# Patient Record
Sex: FEMALE | Race: WHITE | NOT HISPANIC OR LATINO | Employment: UNEMPLOYED | URBAN - METROPOLITAN AREA
[De-identification: names, ages, dates, MRNs, and addresses within clinical notes are randomized per-mention and may not be internally consistent; named-entity substitution may affect disease eponyms.]

---

## 2017-01-10 ENCOUNTER — OFFICE VISIT (OUTPATIENT)
Dept: INTERNAL MEDICINE | Facility: CLINIC | Age: 82
End: 2017-01-10

## 2017-01-10 VITALS
BODY MASS INDEX: 26.86 KG/M2 | HEART RATE: 80 BPM | WEIGHT: 141 LBS | DIASTOLIC BLOOD PRESSURE: 80 MMHG | SYSTOLIC BLOOD PRESSURE: 120 MMHG | TEMPERATURE: 98 F | RESPIRATION RATE: 20 BRPM

## 2017-01-10 DIAGNOSIS — I48.20 CHRONIC ATRIAL FIBRILLATION (HCC): ICD-10-CM

## 2017-01-10 DIAGNOSIS — Z23 NEED FOR PROPHYLACTIC VACCINATION AND INOCULATION AGAINST INFLUENZA: ICD-10-CM

## 2017-01-10 DIAGNOSIS — R10.33 PERIUMBILICAL ABDOMINAL PAIN: ICD-10-CM

## 2017-01-10 DIAGNOSIS — E78.49 OTHER HYPERLIPIDEMIA: ICD-10-CM

## 2017-01-10 DIAGNOSIS — I10 ESSENTIAL HYPERTENSION: ICD-10-CM

## 2017-01-10 DIAGNOSIS — M81.0 OSTEOPOROSIS: ICD-10-CM

## 2017-01-10 DIAGNOSIS — M19.91 PRIMARY OSTEOARTHRITIS, UNSPECIFIED SITE: ICD-10-CM

## 2017-01-10 DIAGNOSIS — E55.9 VITAMIN D DEFICIENCY: ICD-10-CM

## 2017-01-10 DIAGNOSIS — E11.9 TYPE 2 DIABETES MELLITUS WITHOUT COMPLICATION, WITHOUT LONG-TERM CURRENT USE OF INSULIN (HCC): Primary | ICD-10-CM

## 2017-01-10 LAB
BILIRUB BLD-MCNC: NEGATIVE MG/DL
CHOLEST SERPL-MCNC: 203 MG/DL
CLARITY, POC: ABNORMAL
COLOR UR: ABNORMAL
EXPIRATION DATE: ABNORMAL
EXPIRATION DATE: NORMAL
EXPIRATION DATE: NORMAL
GLUCOSE UR STRIP-MCNC: NEGATIVE MG/DL
HBA1C MFR BLD: 5.7 %
HDLC SERPL-MCNC: 93 MG/DL
KETONES UR QL: NEGATIVE
LDLC SERPL CALC-MCNC: 92 MG/DL
LDLC/HDLC SERPL: 1 {RATIO}
LEUKOCYTE EST, POC: ABNORMAL
Lab: ABNORMAL
Lab: NORMAL
Lab: NORMAL
NITRITE UR-MCNC: NEGATIVE MG/ML
PH UR: 5 [PH] (ref 5–8)
PROT UR STRIP-MCNC: NEGATIVE MG/DL
RBC # UR STRIP: NEGATIVE /UL
SP GR UR: 1.02 (ref 1–1.03)
TRIGL SERPL-MCNC: 93 MG/DL
UROBILINOGEN UR QL: NORMAL

## 2017-01-10 PROCEDURE — 83036 HEMOGLOBIN GLYCOSYLATED A1C: CPT | Performed by: INTERNAL MEDICINE

## 2017-01-10 PROCEDURE — 81002 URINALYSIS NONAUTO W/O SCOPE: CPT | Performed by: INTERNAL MEDICINE

## 2017-01-10 PROCEDURE — 36415 COLL VENOUS BLD VENIPUNCTURE: CPT | Performed by: INTERNAL MEDICINE

## 2017-01-10 PROCEDURE — 90662 IIV NO PRSV INCREASED AG IM: CPT | Performed by: INTERNAL MEDICINE

## 2017-01-10 PROCEDURE — 80061 LIPID PANEL: CPT | Performed by: INTERNAL MEDICINE

## 2017-01-10 PROCEDURE — 99214 OFFICE O/P EST MOD 30 MIN: CPT | Performed by: INTERNAL MEDICINE

## 2017-01-10 PROCEDURE — G0008 ADMIN INFLUENZA VIRUS VAC: HCPCS | Performed by: INTERNAL MEDICINE

## 2017-01-10 NOTE — MR AVS SNAPSHOT
Yuli Jose   1/10/2017 10:45 AM   Office Visit    Provider:  Jennie Domínguez MD   Department:  [facility] INTERNAL MEDICINE AND PEDIATRICS   Dept Phone:  [phone]                Your Full Care Plan              Your Updated Medication List          This list is accurate as of: 1/10/17 12:10 PM.  Always use your most recent med list.                amLODIPine 10 MG tablet   Commonly known as:  NORVASC   TAKE 1 TABLET EVERY DAY FOR BLOOD PRESSURE       atorvastatin 10 MG tablet   Commonly known as:  LIPITOR   TAKE 1 TABLET AT BEDTIME       benazepril 20 MG tablet   Commonly known as:  LOTENSIN   TAKE 1 TABLET ONE TIME DAILY       glimepiride 2 MG tablet   Commonly known as:  AMARYL       metoprolol succinate XL 50 MG 24 hr tablet   Commonly known as:  TOPROL-XL   TAKE 1 TABLET ONE TIME DAILY       PRESERVISION AREDS capsule       Prodig[employer] Autocode Blood Glucose W/DEVICE kit       * glucose blood test strip       * PRODIGY AUTOCODE TEST test strip   Generic drug:  glucose blood       * PRODIGY NO CODING BLOOD GLUC test strip   Generic drug:  glucose blood   USE TO TEST ONE TIME DAILY       TYLENOL 500 MG tablet   Generic drug:  acetaminophen       * Notice:  This list has 3 medication(s) that are the same as other medications prescribed for you. Read the directions carefully, and ask your doctor or other care provider to review them with you.            We Performed the Following     Comprehensive Metabolic Panel     High Dose FluZone     POC Glycosylated Hemoglobin (Hb A1C)     POC Lipid Panel     POC Urinalysis Dipstick     TSH     Vitamin D 25 Hydroxy       You Were Diagnosed With        Codes Comments    Type 2 diabetes mellitus without complication, without long-term current use of insulin    -  Primary ICD-10-CM: E11.9  ICD-9-CM: 250.00     Essential hypertension     ICD-10-CM: I10  ICD-9-CM: 401.9     Other hyperlipidemia     ICD-10-CM: E78.4  ICD-9-CM: 272.4     Chronic atrial fibrillation     ICD-10-CM: I48.2  ICD-9-CM: 427.31     Osteoporosis     ICD-10-CM: M81.0  ICD-9-CM: 733.00     Primary osteoarthritis, unspecified site     ICD-10-CM: M19.91  ICD-9-CM: 715.10     Vitamin D deficiency     ICD-10-CM: E55.9  ICD-9-CM: 268.9     Periumbilical abdominal pain     ICD-10-CM: R10.33  ICD-9-CM: 789.05     Need for prophylactic vaccination and inoculation against influenza     ICD-10-CM: Z23  ICD-9-CM: V04.81       Instructions     None    Patient Instructions History      [employer] Signup     [facility] [employer] allows you to send messages to your doctor, view your test results, renew your prescriptions, schedule appointments, and more. To sign up, go to [employer] and click on the Sign Up Now link in the New User? box. Enter your [employer] Activation Code exactly as it appears below along with the last four digits of your Social Security Number and your Date of Birth () to complete the sign-up process. If you do not sign up before the expiration date, you must request a new code.    [employer] Activation Code: [email or web address]  Expires: 2017 12:10 PM    If you have questions, you can email [email or web address] or call [phone] to talk to our [employer] staff. Remember, [employer] is NOT to be used for urgent needs. For medical emergencies, dial 911.               Other Info from Your Visit           Your Appointments     2017  9:30 AM EST   Follow Up with Jennie Domínguez MD   [facility] INTERNAL MEDICINE AND PEDIATRICS (--)    [address] [address] [phone]           Arrive 15 minutes prior to appointment.              Allergies     No Known Allergies      Reason for Visit     Diabetes 6 month follow up   fasting        Vital Signs     Blood Pressure Pulse Temperature Respirations Weight Body Mass Index    120/80 (BP Location: Right arm) 80 98 °F (36.7 °C) (Temporal Artery ) 20 141 lb  (64 kg) 26.86 kg/m2    Smoking Status                   Never Smoker           Problems and Diagnoses Noted     Atrial fibrillation (irregular heartbeat)    Diabetes    High cholesterol or triglycerides    High blood pressure    Osteoarthritis (arthritis due to wear and tear of joints)    Osteoporosis    Vitamin D deficiency    Abdominal pain, around belly button        Needs flu shot          No Longer an Issue     Pain in left knee    Urinary tract infection      Immunizations Administered     Name Date    Influenza Split High Dose Preservative Free IM

## 2017-01-10 NOTE — PROGRESS NOTES
Subjective       Yuli Jose is a 89 y.o. female.     Chief Complaint   Patient presents with   • Diabetes     6 month follow up   fasting         History of Present Illness        History of Present Illness      6 month follow up for chronic medical conditions fasting       History of Present Illness  Primary Care Cardiac Diagnostic Constellation: The patient is here today for a follow-up visit.      Her Diabetes Mellitus Type 2 is stable.   The patient is adherent with her medication regimen. She denies medication side effects.   Medication(s): Glimepiride.   Her Hypertension is primary, but stable.   The patient is adherent with her medication regimen. She denies medication side effects.  Medication(s): Amlodipine, Benazepril, and Metoprolol.   Her Hyperlipidemia has been unstable. Her LDL goal is 70 mg/dL and last LDL was 74 mg/dL.   The patient is adherent with her medication regimen. She denies medication side effects.   Medication(s): Atorvastatin.   Her Atrial Fibrillatiom is stable. She saw Amarilis Gill recently. The patient is adherent with her medication regimen. She denies medication side effects.   Medication(s): Metoprolol. She has been off Aspirin.     Interval Events: The patient states she has not been checking her blood sugar at home recently.  Last hemoglobin A1c was 5.7. She denies episodes of decreased blood sugar. She saw the ophthalmologist 7/14/15 and her macular degeneration was stable. No retinopathy. She was told by Dr. Stephenson she does not need follow up.  She does check her feet daily.      Symptoms: Denies chest pain, denies intermittent leg claudication, denies dyspnea, denies lower extremity edema, denies exercise intolerance, stable fatigue, denies visual impairment, denies muscle pain and denies muscle weakness.  Associated symptoms include recent 13 pounds weight gain and stable memory loss,  but no palpitations, no syncope, no headache, no orthopnea, no PND, no polydipsia, no  polyuria, and no focal neurologic deficits.     Lifestyle and Disease Management: Diet: She consumes a diverse and healthy diet. Weight Issues: She does not have any weight concerns. Exercise: She does not exercise regularly. She does housework.   Smoking: She does not use tobacco.      Osteoporosis: The patient is being seen for routine follow-up of Osteoporosis.   Last DEXA Scan was 2009 (osteoporosis).  Current treatment includes Ared 2 Multivitamin for her macular degeneration daily.   She reports loss of balance (improved) and stable back pain.  Has  difficulty ambulating and difficulty with stairs, but no inability to stand straight, no weight gain, no arthralgias, no myalgias, no hip pain, no wrist pain and no neck pain    The disease type is likely primary/postmenopausal Osteoporosis.     Vitamin D Deficiency:  The patient is here for Vitamin D Deficiency.  She takes Ared 2 Multivitamin, but no additional Vitamin D supplements.  Last Vitamin D3 level on 7/19/16 was 7.5.     Osteoarthritis (Follow-Up): The patient states her Osteoarthritis has been stable since the last visit.   Comorbid Illnesses: Lumbar DDD. She has no complications from osteoarthritis. The patient's osteoarthritis has not resulted in physical disability.   Interval Events: None.  Symptoms: stable back pain,.  denies knee pain, denies hip pain, denies finger pain, denies shoulder pain, and denies neck pain.    Associated symptoms include no localized joint swelling and no localized joint stiffness.   Activities: no limitations.   Medications: The patient is currently on Tylenol and Aleve with mild relief.           Current Outpatient Prescriptions on File Prior to Visit   Medication Sig Dispense Refill   • acetaminophen (TYLENOL) 500 MG tablet Take  by mouth.     • amLODIPine (NORVASC) 10 MG tablet TAKE 1 TABLET EVERY DAY FOR BLOOD PRESSURE 90 tablet 1   • atorvastatin (LIPITOR) 10 MG tablet TAKE 1 TABLET AT BEDTIME 90 tablet 3   •  benazepril (LOTENSIN) 20 MG tablet TAKE 1 TABLET ONE TIME DAILY 90 tablet 3   • Blood Glucose Monitoring Suppl (PRODIGY AUTOCODE BLOOD GLUCOSE) W/DEVICE kit      • glimepiride (AMARYL) 2 MG tablet Take  by mouth.     • glucose blood (PRODIGY AUTOCODE TEST) test strip daily.     • glucose blood test strip daily.     • metoprolol succinate XL (TOPROL-XL) 50 MG 24 hr tablet TAKE 1 TABLET ONE TIME DAILY 90 tablet 3   • Multiple Vitamins-Minerals (PRESERVISION AREDS) capsule Take  by mouth.     • PRODIGY NO CODING BLOOD GLUC test strip USE TO TEST ONE TIME DAILY 100 each 3     No current facility-administered medications on file prior to visit.          The following portions of the patient's history were reviewed and updated as appropriate: allergies, current medications, past family history, past medical history, past social history, past surgical history and problem list.    Review of Systems   Constitutional: Positive for unexpected weight change. Negative for fatigue.   Eyes: Negative for visual disturbance.   Respiratory: Negative for cough, shortness of breath and wheezing.    Cardiovascular: Negative for chest pain, palpitations and leg swelling.        No BENÍTEZ, orthopnea, or claudication.   Gastrointestinal: Positive for abdominal pain (lyric-umbilcal area x 2-3 weeks). Negative for blood in stool, constipation, diarrhea, nausea and vomiting.        No melena.  No heartburn, dysphagia, odynophagia, belching, or bloating.   Endocrine: Negative for polydipsia and polyuria.   Genitourinary: Positive for urgency (chronic). Negative for dysuria, frequency, hematuria and vaginal discharge.   Musculoskeletal: Positive for back pain. Negative for arthralgias and myalgias.   Neurological: Negative for dizziness, syncope and light-headedness.        Stable memory issues.     Psychiatric/Behavioral: Negative for decreased concentration.         Objective       Blood pressure 120/80, pulse 80, temperature 98 °F (36.7 °C),  temperature source Temporal Artery , resp. rate 20, weight 141 lb (64 kg).      Physical Exam   Constitutional: She appears well-developed and well-nourished.   Neck: Normal range of motion. Neck supple. Carotid bruit is not present. No thyromegaly present.   Cardiovascular: Normal rate, regular rhythm, normal heart sounds and intact distal pulses.  Exam reveals no gallop and no friction rub.    No murmur heard.  No peripheral edema.   Pulmonary/Chest: Effort normal and breath sounds normal.   Abdominal: Soft. Bowel sounds are normal. She exhibits no distension, no abdominal bruit and no mass. There is no hepatosplenomegaly. There is tenderness (mild-moderate in lyric-umbilical area). There is no rebound, no guarding and no CVA tenderness.   Psychiatric: She has a normal mood and affect.   Nursing note and vitals reviewed.       Results for orders placed or performed in visit on 01/10/17   POC Lipid Panel   Result Value Ref Range    Total Cholesterol 203 mg/dL    Triglycerides 93 mg/dL    HDL Cholesterol 93 mg/dL    LDL Cholesterol  92 mg/dL    Lot Number [id number]     Expiration Date 8-31-17     LDL/HDL Ratio 1.00    POC Glycosylated Hemoglobin (Hb A1C)   Result Value Ref Range    Hemoglobin A1C 5.7 %    Lot Number [id number]     Expiration Date 8-18    POC Urinalysis Dipstick   Result Value Ref Range    Color [place] Yellow, Straw, Dark Yellow, [place]    Clarity, UA Cloudy (A) Clear    Glucose, UA Negative Negative, 1000 mg/dL (3+) mg/dL    Bilirubin Negative Negative    Ketones, UA Negative Negative    Specific Gravity  1.025 1.005 - 1.030    Blood, UA Negative Negative    pH, Urine 5.0 5.0 - 8.0    Protein, POC Negative Negative mg/dL    Urobilinogen, UA Normal Normal    Leukocytes 75 Marquise/ul (A) Negative    Nitrite, UA Negative Negative    Lot Number [id number]     Expiration Date 9-17          Assessment / Plan:    Yuli was seen today for diabetes.    Diagnoses and all orders for this visit:    Type 2 diabetes mellitus  without complication, without long-term current use of insulin  -     POC Glycosylated Hemoglobin (Hb A1C)  -     Comprehensive Metabolic Panel  -     TSH    Essential hypertension    Other hyperlipidemia  -     POC Lipid Panel    Chronic atrial fibrillation    Osteoporosis    Primary osteoarthritis, unspecified site    Vitamin D deficiency  -     Vitamin D 25 Hydroxy    Periumbilical abdominal pain  -     POC Urinalysis Dipstick  -     US Abdomen Limited; Future  -     Urine Culture    Need for prophylactic vaccination and inoculation against influenza  -     High Dose FluZone    The patient declines Zostavax, DEXA Scan, and Mammogram.      Return in about 6 months (around 7/10/2017) for Recheck-Diabetes, fasting.

## 2017-01-11 LAB
25(OH)D3+25(OH)D2 SERPL-MCNC: 12.6 NG/ML
ALBUMIN SERPL-MCNC: 4.4 G/DL (ref 3.2–4.8)
ALBUMIN/GLOB SERPL: 1.4 G/DL (ref 1.5–2.5)
ALP SERPL-CCNC: 79 U/L (ref 25–100)
ALT SERPL-CCNC: 18 U/L (ref 7–40)
AST SERPL-CCNC: 22 U/L (ref 0–33)
BILIRUB SERPL-MCNC: 0.9 MG/DL (ref 0.3–1.2)
BUN SERPL-MCNC: 19 MG/DL (ref 9–23)
BUN/CREAT SERPL: 23.8 (ref 7–25)
CALCIUM SERPL-MCNC: 10 MG/DL (ref 8.7–10.4)
CHLORIDE SERPL-SCNC: 102 MMOL/L (ref 99–109)
CO2 SERPL-SCNC: 31 MMOL/L (ref 20–31)
CREAT SERPL-MCNC: 0.8 MG/DL (ref 0.6–1.3)
GLOBULIN SER CALC-MCNC: 3.2 GM/DL
GLUCOSE SERPL-MCNC: 129 MG/DL (ref 70–100)
POTASSIUM SERPL-SCNC: 4.7 MMOL/L (ref 3.5–5.5)
PROT SERPL-MCNC: 7.6 G/DL (ref 5.7–8.2)
SODIUM SERPL-SCNC: 142 MMOL/L (ref 132–146)
TSH SERPL DL<=0.005 MIU/L-ACNC: 1.76 MIU/ML (ref 0.35–5.35)

## 2017-01-13 LAB
BACTERIA UR CULT: NORMAL
BACTERIA UR CULT: NORMAL

## 2017-01-18 ENCOUNTER — TELEPHONE (OUTPATIENT)
Dept: INTERNAL MEDICINE | Facility: CLINIC | Age: 82
End: 2017-01-18

## 2017-01-18 NOTE — TELEPHONE ENCOUNTER
----- Message from Colette Lara sent at 1/18/2017 12:38 PM EST -----  Concerning pts ultrasound, patient said she is not having any abdominal pain any more and doesn't feel she needs this. Is this okay to cancel?

## 2017-04-11 ENCOUNTER — OFFICE VISIT (OUTPATIENT)
Dept: INTERNAL MEDICINE | Facility: CLINIC | Age: 82
End: 2017-04-11

## 2017-04-11 VITALS
BODY MASS INDEX: 25.91 KG/M2 | OXYGEN SATURATION: 94 % | DIASTOLIC BLOOD PRESSURE: 68 MMHG | HEART RATE: 76 BPM | RESPIRATION RATE: 20 BRPM | SYSTOLIC BLOOD PRESSURE: 128 MMHG | WEIGHT: 136 LBS | TEMPERATURE: 97.6 F

## 2017-04-11 DIAGNOSIS — M19.91 PRIMARY OSTEOARTHRITIS, UNSPECIFIED SITE: ICD-10-CM

## 2017-04-11 DIAGNOSIS — I48.20 CHRONIC ATRIAL FIBRILLATION (HCC): ICD-10-CM

## 2017-04-11 DIAGNOSIS — E55.9 VITAMIN D DEFICIENCY: ICD-10-CM

## 2017-04-11 DIAGNOSIS — E78.49 OTHER HYPERLIPIDEMIA: ICD-10-CM

## 2017-04-11 DIAGNOSIS — M81.0 OSTEOPOROSIS: ICD-10-CM

## 2017-04-11 DIAGNOSIS — I10 ESSENTIAL HYPERTENSION: ICD-10-CM

## 2017-04-11 DIAGNOSIS — E11.9 TYPE 2 DIABETES MELLITUS WITHOUT COMPLICATION, WITHOUT LONG-TERM CURRENT USE OF INSULIN (HCC): Primary | ICD-10-CM

## 2017-04-11 LAB
25(OH)D3+25(OH)D2 SERPL-MCNC: 9.1 NG/ML
ALBUMIN SERPL-MCNC: 4.2 G/DL (ref 3.2–4.8)
ALBUMIN/GLOB SERPL: 1.5 G/DL (ref 1.5–2.5)
ALP SERPL-CCNC: 82 U/L (ref 25–100)
ALT SERPL-CCNC: 13 U/L (ref 7–40)
AST SERPL-CCNC: 21 U/L (ref 0–33)
BASOPHILS # BLD AUTO: 0.05 10*3/MM3 (ref 0–0.2)
BASOPHILS NFR BLD AUTO: 0.8 % (ref 0–1)
BILIRUB SERPL-MCNC: 0.6 MG/DL (ref 0.3–1.2)
BUN SERPL-MCNC: 13 MG/DL (ref 9–23)
BUN/CREAT SERPL: 18.6 (ref 7–25)
CALCIUM SERPL-MCNC: 10 MG/DL (ref 8.7–10.4)
CHLORIDE SERPL-SCNC: 98 MMOL/L (ref 99–109)
CHOLEST SERPL-MCNC: 223 MG/DL (ref 0–200)
CO2 SERPL-SCNC: 26 MMOL/L (ref 20–31)
CREAT SERPL-MCNC: 0.7 MG/DL (ref 0.6–1.3)
EOSINOPHIL # BLD AUTO: 0.15 10*3/MM3 (ref 0.1–0.3)
EOSINOPHIL NFR BLD AUTO: 2.4 % (ref 0–3)
ERYTHROCYTE [DISTWIDTH] IN BLOOD BY AUTOMATED COUNT: 13.7 % (ref 11.3–14.5)
EXPIRATION DATE: NORMAL
GLOBULIN SER CALC-MCNC: 2.8 GM/DL
GLUCOSE SERPL-MCNC: 136 MG/DL (ref 70–100)
HBA1C MFR BLD: 5.9 %
HCT VFR BLD AUTO: 45.1 % (ref 34.5–44)
HDLC SERPL-MCNC: 69 MG/DL (ref 40–60)
HGB BLD-MCNC: 15.1 G/DL (ref 11.5–15.5)
IMM GRANULOCYTES # BLD: 0.01 10*3/MM3 (ref 0–0.03)
IMM GRANULOCYTES NFR BLD: 0.2 % (ref 0–0.6)
LDLC SERPL CALC-MCNC: 129 MG/DL (ref 0–100)
LYMPHOCYTES # BLD AUTO: 2.13 10*3/MM3 (ref 0.6–4.8)
LYMPHOCYTES NFR BLD AUTO: 33.5 % (ref 24–44)
Lab: NORMAL
MCH RBC QN AUTO: 32.2 PG (ref 27–31)
MCHC RBC AUTO-ENTMCNC: 33.5 G/DL (ref 32–36)
MCV RBC AUTO: 96.2 FL (ref 80–99)
MONOCYTES # BLD AUTO: 0.76 10*3/MM3 (ref 0–1)
MONOCYTES NFR BLD AUTO: 11.9 % (ref 0–12)
NEUTROPHILS # BLD AUTO: 3.26 10*3/MM3 (ref 1.5–8.3)
NEUTROPHILS NFR BLD AUTO: 51.2 % (ref 41–71)
PLATELET # BLD AUTO: 232 10*3/MM3 (ref 150–450)
POTASSIUM SERPL-SCNC: 4.8 MMOL/L (ref 3.5–5.5)
PROT SERPL-MCNC: 7 G/DL (ref 5.7–8.2)
RBC # BLD AUTO: 4.69 10*6/MM3 (ref 3.89–5.14)
SODIUM SERPL-SCNC: 131 MMOL/L (ref 132–146)
TRIGL SERPL-MCNC: 123 MG/DL (ref 0–150)
TSH SERPL DL<=0.005 MIU/L-ACNC: 1.58 MIU/ML (ref 0.35–5.35)
VLDLC SERPL CALC-MCNC: 24.6 MG/DL
WBC # BLD AUTO: 6.36 10*3/MM3 (ref 3.5–10.8)

## 2017-04-11 PROCEDURE — 36415 COLL VENOUS BLD VENIPUNCTURE: CPT | Performed by: INTERNAL MEDICINE

## 2017-04-11 PROCEDURE — 99214 OFFICE O/P EST MOD 30 MIN: CPT | Performed by: INTERNAL MEDICINE

## 2017-04-11 PROCEDURE — 83036 HEMOGLOBIN GLYCOSYLATED A1C: CPT | Performed by: INTERNAL MEDICINE

## 2017-04-11 RX ORDER — GLIMEPIRIDE 2 MG/1
2 TABLET ORAL 2 TIMES DAILY
COMMUNITY
End: 2020-06-08
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
## 2017-04-12 ENCOUNTER — TELEPHONE (OUTPATIENT)
Dept: INTERNAL MEDICINE | Facility: CLINIC | Age: 82
End: 2017-04-12

## 2020-05-11 PROBLEM — S72.002A CLOSED LEFT HIP FRACTURE (HCC): Status: ACTIVE | Noted: 2020-05-11

## 2020-06-08 ENCOUNTER — APPOINTMENT (OUTPATIENT)
Dept: GENERAL RADIOLOGY | Facility: HOSPITAL | Age: 85
End: 2020-06-08

## 2020-06-08 ENCOUNTER — ANESTHESIA EVENT (OUTPATIENT)
Dept: PERIOP | Facility: HOSPITAL | Age: 85
End: 2020-06-08

## 2020-06-08 ENCOUNTER — HOSPITAL ENCOUNTER (INPATIENT)
Facility: HOSPITAL | Age: 85
LOS: 3 days | Discharge: NURSING, REHAB OR LONG-TERM CARE | End: 2020-06-11
Attending: EMERGENCY MEDICINE | Admitting: INTERNAL MEDICINE

## 2020-06-08 DIAGNOSIS — S72.011A CLOSED SUBCAPITAL FRACTURE OF RIGHT FEMUR, INITIAL ENCOUNTER (HCC): Primary | ICD-10-CM

## 2020-06-08 DIAGNOSIS — S72.011S: ICD-10-CM

## 2020-06-08 DIAGNOSIS — F03.C0 ADVANCED DEMENTIA (HCC): ICD-10-CM

## 2020-06-08 DIAGNOSIS — Z91.81 HISTORY OF RECENT FALL: ICD-10-CM

## 2020-06-08 DIAGNOSIS — Z74.1 REQUIRES ASSISTANCE WITH ACTIVITIES OF DAILY LIVING (ADL): ICD-10-CM

## 2020-06-08 PROBLEM — N39.0 ACUTE UTI (URINARY TRACT INFECTION): Status: ACTIVE | Noted: 2020-06-08

## 2020-06-08 PROBLEM — N13.9 ACUTE URINARY OBSTRUCTION: Status: ACTIVE | Noted: 2020-06-08

## 2020-06-08 LAB
ABO GROUP BLD: NORMAL
ABO GROUP BLD: NORMAL
ALBUMIN SERPL-MCNC: 3 G/DL (ref 3.5–5.2)
ALBUMIN/GLOB SERPL: 1 G/DL
ALP SERPL-CCNC: 140 U/L (ref 39–117)
ALT SERPL W P-5'-P-CCNC: 5 U/L (ref 1–33)
ANION GAP SERPL CALCULATED.3IONS-SCNC: 10 MMOL/L (ref 5–15)
APTT PPP: 36.5 SECONDS (ref 24–37)
AST SERPL-CCNC: 16 U/L (ref 1–32)
BACTERIA UR QL AUTO: NORMAL /HPF
BASOPHILS # BLD AUTO: 0.04 10*3/MM3 (ref 0–0.2)
BASOPHILS NFR BLD AUTO: 0.8 % (ref 0–1.5)
BILIRUB SERPL-MCNC: 0.4 MG/DL (ref 0.2–1.2)
BILIRUB UR QL STRIP: ABNORMAL
BLD GP AB SCN SERPL QL: NEGATIVE
BUN BLD-MCNC: 28 MG/DL (ref 8–23)
BUN/CREAT SERPL: 42.4 (ref 7–25)
CALCIUM SPEC-SCNC: 8.5 MG/DL (ref 8.2–9.6)
CHLORIDE SERPL-SCNC: 102 MMOL/L (ref 98–107)
CLARITY UR: CLEAR
CO2 SERPL-SCNC: 28 MMOL/L (ref 22–29)
COLOR UR: ABNORMAL
CREAT BLD-MCNC: 0.66 MG/DL (ref 0.57–1)
DEPRECATED RDW RBC AUTO: 61.1 FL (ref 37–54)
EOSINOPHIL # BLD AUTO: 0.28 10*3/MM3 (ref 0–0.4)
EOSINOPHIL NFR BLD AUTO: 5.3 % (ref 0.3–6.2)
ERYTHROCYTE [DISTWIDTH] IN BLOOD BY AUTOMATED COUNT: 16.1 % (ref 12.3–15.4)
GFR SERPL CREATININE-BSD FRML MDRD: 83 ML/MIN/1.73
GLOBULIN UR ELPH-MCNC: 3 GM/DL
GLUCOSE BLD-MCNC: 84 MG/DL (ref 65–99)
GLUCOSE UR STRIP-MCNC: NEGATIVE MG/DL
HCT VFR BLD AUTO: 38.8 % (ref 34–46.6)
HGB BLD-MCNC: 12.3 G/DL (ref 12–15.9)
HGB UR QL STRIP.AUTO: NEGATIVE
HYALINE CASTS UR QL AUTO: NORMAL /LPF
IMM GRANULOCYTES # BLD AUTO: 0.02 10*3/MM3 (ref 0–0.05)
IMM GRANULOCYTES NFR BLD AUTO: 0.4 % (ref 0–0.5)
INR PPP: 1.09 (ref 0.85–1.16)
KETONES UR QL STRIP: NEGATIVE
LEUKOCYTE ESTERASE UR QL STRIP.AUTO: ABNORMAL
LYMPHOCYTES # BLD AUTO: 1.73 10*3/MM3 (ref 0.7–3.1)
LYMPHOCYTES NFR BLD AUTO: 32.8 % (ref 19.6–45.3)
MCH RBC QN AUTO: 32.5 PG (ref 26.6–33)
MCHC RBC AUTO-ENTMCNC: 31.7 G/DL (ref 31.5–35.7)
MCV RBC AUTO: 102.4 FL (ref 79–97)
MONOCYTES # BLD AUTO: 0.71 10*3/MM3 (ref 0.1–0.9)
MONOCYTES NFR BLD AUTO: 13.5 % (ref 5–12)
NEUTROPHILS # BLD AUTO: 2.49 10*3/MM3 (ref 1.7–7)
NEUTROPHILS NFR BLD AUTO: 47.2 % (ref 42.7–76)
NITRITE UR QL STRIP: POSITIVE
NRBC BLD AUTO-RTO: 0 /100 WBC (ref 0–0.2)
NT-PROBNP SERPL-MCNC: 2499 PG/ML (ref 5–1800)
PH UR STRIP.AUTO: 5.5 [PH] (ref 5–8)
PLATELET # BLD AUTO: 242 10*3/MM3 (ref 140–450)
PMV BLD AUTO: 9.8 FL (ref 6–12)
POTASSIUM BLD-SCNC: 4.6 MMOL/L (ref 3.5–5.2)
PROT SERPL-MCNC: 6 G/DL (ref 6–8.5)
PROT UR QL STRIP: NEGATIVE
PROTHROMBIN TIME: 13.8 SECONDS (ref 11.5–14)
RBC # BLD AUTO: 3.79 10*6/MM3 (ref 3.77–5.28)
RBC # UR: NORMAL /HPF
REF LAB TEST METHOD: NORMAL
RH BLD: POSITIVE
RH BLD: POSITIVE
SARS-COV-2 RNA PNL SPEC NAA+PROBE: NOT DETECTED
SODIUM BLD-SCNC: 140 MMOL/L (ref 136–145)
SP GR UR STRIP: >=1.03 (ref 1–1.03)
SQUAMOUS #/AREA URNS HPF: NORMAL /HPF
T&S EXPIRATION DATE: NORMAL
TROPONIN T SERPL-MCNC: <0.01 NG/ML (ref 0–0.03)
UROBILINOGEN UR QL STRIP: ABNORMAL
WBC NRBC COR # BLD: 5.27 10*3/MM3 (ref 3.4–10.8)
WBC UR QL AUTO: NORMAL /HPF

## 2020-06-08 PROCEDURE — 86901 BLOOD TYPING SEROLOGIC RH(D): CPT

## 2020-06-08 PROCEDURE — 71045 X-RAY EXAM CHEST 1 VIEW: CPT

## 2020-06-08 PROCEDURE — 81001 URINALYSIS AUTO W/SCOPE: CPT | Performed by: EMERGENCY MEDICINE

## 2020-06-08 PROCEDURE — 73502 X-RAY EXAM HIP UNI 2-3 VIEWS: CPT

## 2020-06-08 PROCEDURE — 85610 PROTHROMBIN TIME: CPT | Performed by: EMERGENCY MEDICINE

## 2020-06-08 PROCEDURE — 87635 SARS-COV-2 COVID-19 AMP PRB: CPT | Performed by: ORTHOPAEDIC SURGERY

## 2020-06-08 PROCEDURE — 83880 ASSAY OF NATRIURETIC PEPTIDE: CPT | Performed by: EMERGENCY MEDICINE

## 2020-06-08 PROCEDURE — 85025 COMPLETE CBC W/AUTO DIFF WBC: CPT | Performed by: EMERGENCY MEDICINE

## 2020-06-08 PROCEDURE — 86900 BLOOD TYPING SEROLOGIC ABO: CPT | Performed by: EMERGENCY MEDICINE

## 2020-06-08 PROCEDURE — 93005 ELECTROCARDIOGRAM TRACING: CPT | Performed by: EMERGENCY MEDICINE

## 2020-06-08 PROCEDURE — 86850 RBC ANTIBODY SCREEN: CPT | Performed by: EMERGENCY MEDICINE

## 2020-06-08 PROCEDURE — 99284 EMERGENCY DEPT VISIT MOD MDM: CPT

## 2020-06-08 PROCEDURE — 99222 1ST HOSP IP/OBS MODERATE 55: CPT | Performed by: FAMILY MEDICINE

## 2020-06-08 PROCEDURE — 84484 ASSAY OF TROPONIN QUANT: CPT | Performed by: EMERGENCY MEDICINE

## 2020-06-08 PROCEDURE — 85730 THROMBOPLASTIN TIME PARTIAL: CPT | Performed by: EMERGENCY MEDICINE

## 2020-06-08 PROCEDURE — 25010000002 CEFTRIAXONE PER 250 MG: Performed by: PHYSICIAN ASSISTANT

## 2020-06-08 PROCEDURE — 86901 BLOOD TYPING SEROLOGIC RH(D): CPT | Performed by: EMERGENCY MEDICINE

## 2020-06-08 PROCEDURE — 87086 URINE CULTURE/COLONY COUNT: CPT | Performed by: PHYSICIAN ASSISTANT

## 2020-06-08 PROCEDURE — 80053 COMPREHEN METABOLIC PANEL: CPT | Performed by: EMERGENCY MEDICINE

## 2020-06-08 PROCEDURE — 86900 BLOOD TYPING SEROLOGIC ABO: CPT

## 2020-06-08 RX ORDER — INSULIN GLARGINE 100 [IU]/ML
10 INJECTION, SOLUTION SUBCUTANEOUS DAILY
COMMUNITY

## 2020-06-08 RX ORDER — SODIUM CHLORIDE 0.9 % (FLUSH) 0.9 %
10 SYRINGE (ML) INJECTION AS NEEDED
Status: DISCONTINUED | OUTPATIENT
Start: 2020-06-08 | End: 2020-06-11 | Stop reason: HOSPADM

## 2020-06-08 RX ORDER — NICOTINE POLACRILEX 4 MG
15 LOZENGE BUCCAL
Status: DISCONTINUED | OUTPATIENT
Start: 2020-06-08 | End: 2020-06-11 | Stop reason: HOSPADM

## 2020-06-08 RX ORDER — SODIUM CHLORIDE, SODIUM LACTATE, POTASSIUM CHLORIDE, CALCIUM CHLORIDE 600; 310; 30; 20 MG/100ML; MG/100ML; MG/100ML; MG/100ML
9 INJECTION, SOLUTION INTRAVENOUS CONTINUOUS
Status: DISCONTINUED | OUTPATIENT
Start: 2020-06-08 | End: 2020-06-10

## 2020-06-08 RX ORDER — POLYETHYLENE GLYCOL 3350 17 G/17G
17 POWDER, FOR SOLUTION ORAL DAILY PRN
Status: DISCONTINUED | OUTPATIENT
Start: 2020-06-08 | End: 2020-06-11 | Stop reason: HOSPADM

## 2020-06-08 RX ORDER — METOPROLOL SUCCINATE 25 MG/1
25 TABLET, EXTENDED RELEASE ORAL DAILY
COMMUNITY

## 2020-06-08 RX ORDER — ASPIRIN 81 MG/1
81 TABLET ORAL DAILY
COMMUNITY

## 2020-06-08 RX ORDER — SODIUM CHLORIDE 0.9 % (FLUSH) 0.9 %
10 SYRINGE (ML) INJECTION EVERY 12 HOURS SCHEDULED
Status: DISCONTINUED | OUTPATIENT
Start: 2020-06-08 | End: 2020-06-11 | Stop reason: HOSPADM

## 2020-06-08 RX ORDER — LISINOPRIL 10 MG/1
10 TABLET ORAL
Status: DISCONTINUED | OUTPATIENT
Start: 2020-06-09 | End: 2020-06-11 | Stop reason: HOSPADM

## 2020-06-08 RX ORDER — SODIUM CHLORIDE 0.9 % (FLUSH) 0.9 %
10 SYRINGE (ML) INJECTION EVERY 12 HOURS SCHEDULED
Status: DISCONTINUED | OUTPATIENT
Start: 2020-06-08 | End: 2020-06-09 | Stop reason: HOSPADM

## 2020-06-08 RX ORDER — SODIUM CHLORIDE 0.9 % (FLUSH) 0.9 %
10 SYRINGE (ML) INJECTION AS NEEDED
Status: DISCONTINUED | OUTPATIENT
Start: 2020-06-08 | End: 2020-06-09 | Stop reason: HOSPADM

## 2020-06-08 RX ORDER — AMOXICILLIN AND CLAVULANATE POTASSIUM 875; 125 MG/1; MG/1
1 TABLET, FILM COATED ORAL 2 TIMES DAILY
COMMUNITY
End: 2020-06-11 | Stop reason: HOSPADM

## 2020-06-08 RX ORDER — FAMOTIDINE 20 MG/1
20 TABLET, FILM COATED ORAL ONCE
Status: DISCONTINUED | OUTPATIENT
Start: 2020-06-08 | End: 2020-06-08

## 2020-06-08 RX ORDER — DOCUSATE SODIUM 100 MG/1
100 CAPSULE, LIQUID FILLED ORAL DAILY
Status: DISCONTINUED | OUTPATIENT
Start: 2020-06-09 | End: 2020-06-11 | Stop reason: HOSPADM

## 2020-06-08 RX ORDER — DOCUSATE SODIUM 100 MG/1
100 CAPSULE, LIQUID FILLED ORAL DAILY
COMMUNITY

## 2020-06-08 RX ORDER — SODIUM CHLORIDE 9 MG/ML
50 INJECTION, SOLUTION INTRAVENOUS CONTINUOUS
Status: ACTIVE | OUTPATIENT
Start: 2020-06-08 | End: 2020-06-09

## 2020-06-08 RX ORDER — CHOLECALCIFEROL (VITAMIN D3) 125 MCG
5 CAPSULE ORAL NIGHTLY PRN
Status: DISCONTINUED | OUTPATIENT
Start: 2020-06-08 | End: 2020-06-11 | Stop reason: HOSPADM

## 2020-06-08 RX ORDER — LANOLIN ALCOHOL/MO/W.PET/CERES
1000 CREAM (GRAM) TOPICAL DAILY
COMMUNITY
End: 2020-06-08

## 2020-06-08 RX ORDER — ACETAMINOPHEN 325 MG/1
650 TABLET ORAL EVERY 4 HOURS PRN
Status: DISCONTINUED | OUTPATIENT
Start: 2020-06-08 | End: 2020-06-11 | Stop reason: HOSPADM

## 2020-06-08 RX ORDER — LIDOCAINE HYDROCHLORIDE 10 MG/ML
0.5 INJECTION, SOLUTION EPIDURAL; INFILTRATION; INTRACAUDAL; PERINEURAL ONCE AS NEEDED
Status: DISCONTINUED | OUTPATIENT
Start: 2020-06-08 | End: 2020-06-09 | Stop reason: HOSPADM

## 2020-06-08 RX ORDER — DEXTROSE MONOHYDRATE 25 G/50ML
25 INJECTION, SOLUTION INTRAVENOUS
Status: DISCONTINUED | OUTPATIENT
Start: 2020-06-08 | End: 2020-06-11 | Stop reason: HOSPADM

## 2020-06-08 RX ORDER — ACETAMINOPHEN 325 MG/1
650 TABLET ORAL EVERY 6 HOURS PRN
COMMUNITY

## 2020-06-08 RX ORDER — ONDANSETRON 2 MG/ML
4 INJECTION INTRAMUSCULAR; INTRAVENOUS EVERY 6 HOURS PRN
Status: DISCONTINUED | OUTPATIENT
Start: 2020-06-08 | End: 2020-06-11 | Stop reason: HOSPADM

## 2020-06-08 RX ORDER — METOPROLOL SUCCINATE 25 MG/1
25 TABLET, EXTENDED RELEASE ORAL DAILY
Status: DISCONTINUED | OUTPATIENT
Start: 2020-06-09 | End: 2020-06-11 | Stop reason: HOSPADM

## 2020-06-08 RX ORDER — TRAMADOL HYDROCHLORIDE 50 MG/1
50 TABLET ORAL DAILY
COMMUNITY
End: 2020-06-11 | Stop reason: HOSPADM

## 2020-06-08 RX ORDER — BENAZEPRIL HYDROCHLORIDE 10 MG/1
10 TABLET ORAL DAILY
COMMUNITY

## 2020-06-08 RX ORDER — PHENAZOPYRIDINE HYDROCHLORIDE 100 MG/1
100 TABLET, FILM COATED ORAL 3 TIMES DAILY PRN
COMMUNITY
End: 2020-06-11 | Stop reason: HOSPADM

## 2020-06-08 RX ORDER — FAMOTIDINE 10 MG/ML
20 INJECTION, SOLUTION INTRAVENOUS ONCE
Status: COMPLETED | OUTPATIENT
Start: 2020-06-08 | End: 2020-06-08

## 2020-06-08 RX ADMIN — CEFTRIAXONE 1 G: 1 INJECTION, POWDER, FOR SOLUTION INTRAMUSCULAR; INTRAVENOUS at 21:26

## 2020-06-08 RX ADMIN — SODIUM CHLORIDE, PRESERVATIVE FREE 10 ML: 5 INJECTION INTRAVENOUS at 21:14

## 2020-06-08 RX ADMIN — SODIUM CHLORIDE, POTASSIUM CHLORIDE, SODIUM LACTATE AND CALCIUM CHLORIDE 9 ML/HR: 600; 310; 30; 20 INJECTION, SOLUTION INTRAVENOUS at 21:14

## 2020-06-08 RX ADMIN — FAMOTIDINE 20 MG: 10 INJECTION, SOLUTION INTRAVENOUS at 21:14
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
## 2020-06-09 ENCOUNTER — APPOINTMENT (OUTPATIENT)
Dept: GENERAL RADIOLOGY | Facility: HOSPITAL | Age: 85
End: 2020-06-09

## 2020-06-09 ENCOUNTER — ANESTHESIA (OUTPATIENT)
Dept: PERIOP | Facility: HOSPITAL | Age: 85
End: 2020-06-09

## 2020-06-09 LAB
ANION GAP SERPL CALCULATED.3IONS-SCNC: 13 MMOL/L (ref 5–15)
BUN BLD-MCNC: 20 MG/DL (ref 8–23)
BUN/CREAT SERPL: 38.5 (ref 7–25)
CALCIUM SPEC-SCNC: 8.7 MG/DL (ref 8.2–9.6)
CHLORIDE SERPL-SCNC: 102 MMOL/L (ref 98–107)
CO2 SERPL-SCNC: 20 MMOL/L (ref 22–29)
CREAT BLD-MCNC: 0.52 MG/DL (ref 0.57–1)
DEPRECATED RDW RBC AUTO: 61.5 FL (ref 37–54)
ERYTHROCYTE [DISTWIDTH] IN BLOOD BY AUTOMATED COUNT: 16 % (ref 12.3–15.4)
GFR SERPL CREATININE-BSD FRML MDRD: 109 ML/MIN/1.73
GLUCOSE BLD-MCNC: 74 MG/DL (ref 65–99)
GLUCOSE BLDC GLUCOMTR-MCNC: 109 MG/DL (ref 70–130)
GLUCOSE BLDC GLUCOMTR-MCNC: 114 MG/DL (ref 70–130)
HCT VFR BLD AUTO: 41.6 % (ref 34–46.6)
HGB BLD-MCNC: 13 G/DL (ref 12–15.9)
MCH RBC QN AUTO: 32.2 PG (ref 26.6–33)
MCHC RBC AUTO-ENTMCNC: 31.3 G/DL (ref 31.5–35.7)
MCV RBC AUTO: 103 FL (ref 79–97)
PLATELET # BLD AUTO: 254 10*3/MM3 (ref 140–450)
PMV BLD AUTO: 10.4 FL (ref 6–12)
POTASSIUM BLD-SCNC: 4.7 MMOL/L (ref 3.5–5.2)
RBC # BLD AUTO: 4.04 10*6/MM3 (ref 3.77–5.28)
SODIUM BLD-SCNC: 135 MMOL/L (ref 136–145)
WBC NRBC COR # BLD: 6 10*3/MM3 (ref 3.4–10.8)

## 2020-06-09 PROCEDURE — 73502 X-RAY EXAM HIP UNI 2-3 VIEWS: CPT

## 2020-06-09 PROCEDURE — 99232 SBSQ HOSP IP/OBS MODERATE 35: CPT | Performed by: INTERNAL MEDICINE

## 2020-06-09 PROCEDURE — 25010000002 PROPOFOL 10 MG/ML EMULSION: Performed by: ANESTHESIOLOGY

## 2020-06-09 PROCEDURE — 73070 X-RAY EXAM OF ELBOW: CPT

## 2020-06-09 PROCEDURE — 25010000003 EPINEPHRINE 30 MG/30ML SOLUTION 30 ML VIAL: Performed by: ORTHOPAEDIC SURGERY

## 2020-06-09 PROCEDURE — C1713 ANCHOR/SCREW BN/BN,TIS/BN: HCPCS | Performed by: ORTHOPAEDIC SURGERY

## 2020-06-09 PROCEDURE — 80048 BASIC METABOLIC PNL TOTAL CA: CPT | Performed by: PHYSICIAN ASSISTANT

## 2020-06-09 PROCEDURE — 25010000002 LORAZEPAM PER 2 MG: Performed by: INTERNAL MEDICINE

## 2020-06-09 PROCEDURE — 25010000002 CEFTRIAXONE PER 250 MG: Performed by: ORTHOPAEDIC SURGERY

## 2020-06-09 PROCEDURE — 25010000002 ONDANSETRON PER 1 MG: Performed by: ANESTHESIOLOGY

## 2020-06-09 PROCEDURE — 25010000002 NEOSTIGMINE 10 MG/10ML SOLUTION: Performed by: ANESTHESIOLOGY

## 2020-06-09 PROCEDURE — 76000 FLUOROSCOPY <1 HR PHYS/QHP: CPT

## 2020-06-09 PROCEDURE — 73110 X-RAY EXAM OF WRIST: CPT

## 2020-06-09 PROCEDURE — 27236 TREAT THIGH FRACTURE: CPT | Performed by: PHYSICIAN ASSISTANT

## 2020-06-09 PROCEDURE — 82962 GLUCOSE BLOOD TEST: CPT

## 2020-06-09 PROCEDURE — 85027 COMPLETE CBC AUTOMATED: CPT | Performed by: PHYSICIAN ASSISTANT

## 2020-06-09 PROCEDURE — 25010000003 CEFAZOLIN IN DEXTROSE 2-4 GM/100ML-% SOLUTION: Performed by: ORTHOPAEDIC SURGERY

## 2020-06-09 PROCEDURE — C1776 JOINT DEVICE (IMPLANTABLE): HCPCS | Performed by: ORTHOPAEDIC SURGERY

## 2020-06-09 PROCEDURE — 25010000002 PHENYLEPHRINE PER 1 ML: Performed by: ANESTHESIOLOGY

## 2020-06-09 PROCEDURE — 73630 X-RAY EXAM OF FOOT: CPT

## 2020-06-09 PROCEDURE — 25010000002 MORPHINE PER 10 MG: Performed by: FAMILY MEDICINE

## 2020-06-09 PROCEDURE — 25010000002 DEXAMETHASONE SODIUM PHOSPHATE 10 MG/ML SOLUTION: Performed by: ANESTHESIOLOGY

## 2020-06-09 DEVICE — CAP PRT HIP BIPOL FX: Type: IMPLANTABLE DEVICE | Site: HIP | Status: FUNCTIONAL

## 2020-06-09 DEVICE — BONE PREPARATION KIT
Type: IMPLANTABLE DEVICE | Site: HIP | Status: FUNCTIONAL
Brand: BIOPREP

## 2020-06-09 DEVICE — IMPLANTABLE DEVICE: Type: IMPLANTABLE DEVICE | Site: HIP | Status: FUNCTIONAL

## 2020-06-09 DEVICE — HD FEM MOD COCR 28MM STD/NK: Type: IMPLANTABLE DEVICE | Site: HIP | Status: FUNCTIONAL

## 2020-06-09 DEVICE — CUP ACET RINGLOC BIPOL 28MM 46MM: Type: IMPLANTABLE DEVICE | Site: HIP | Status: FUNCTIONAL

## 2020-06-09 DEVICE — CMT BONE SIMPLEX/P TMYCIN FDOS 10PK: Type: IMPLANTABLE DEVICE | Site: HIP | Status: FUNCTIONAL

## 2020-06-09 DEVICE — DEV CONTRL TISS STRATAFIX SYMM PDS PLUS VIL CT-1 45CM: Type: IMPLANTABLE DEVICE | Site: HIP | Status: FUNCTIONAL

## 2020-06-09 RX ORDER — CEFAZOLIN SODIUM 2 G/100ML
2 INJECTION, SOLUTION INTRAVENOUS ONCE
Status: COMPLETED | OUTPATIENT
Start: 2020-06-09 | End: 2020-06-09

## 2020-06-09 RX ORDER — SODIUM CHLORIDE, SODIUM LACTATE, POTASSIUM CHLORIDE, CALCIUM CHLORIDE 600; 310; 30; 20 MG/100ML; MG/100ML; MG/100ML; MG/100ML
100 INJECTION, SOLUTION INTRAVENOUS CONTINUOUS
Status: DISCONTINUED | OUTPATIENT
Start: 2020-06-09 | End: 2020-06-11 | Stop reason: HOSPADM

## 2020-06-09 RX ORDER — OXYCODONE HYDROCHLORIDE 5 MG/1
5 TABLET ORAL EVERY 4 HOURS PRN
Status: DISCONTINUED | OUTPATIENT
Start: 2020-06-09 | End: 2020-06-11 | Stop reason: HOSPADM

## 2020-06-09 RX ORDER — TRAMADOL HYDROCHLORIDE 50 MG/1
50 TABLET ORAL EVERY 6 HOURS PRN
Status: DISCONTINUED | OUTPATIENT
Start: 2020-06-09 | End: 2020-06-11 | Stop reason: HOSPADM

## 2020-06-09 RX ORDER — LORAZEPAM 2 MG/ML
0.5 INJECTION INTRAMUSCULAR ONCE
Status: COMPLETED | OUTPATIENT
Start: 2020-06-09 | End: 2020-06-09

## 2020-06-09 RX ORDER — SODIUM CHLORIDE 0.9 % (FLUSH) 0.9 %
10 SYRINGE (ML) INJECTION EVERY 12 HOURS SCHEDULED
Status: DISCONTINUED | OUTPATIENT
Start: 2020-06-09 | End: 2020-06-09 | Stop reason: HOSPADM

## 2020-06-09 RX ORDER — SODIUM CHLORIDE, SODIUM LACTATE, POTASSIUM CHLORIDE, CALCIUM CHLORIDE 600; 310; 30; 20 MG/100ML; MG/100ML; MG/100ML; MG/100ML
9 INJECTION, SOLUTION INTRAVENOUS CONTINUOUS
Status: DISCONTINUED | OUTPATIENT
Start: 2020-06-09 | End: 2020-06-10

## 2020-06-09 RX ORDER — OXYCODONE HYDROCHLORIDE 5 MG/1
10 TABLET ORAL EVERY 4 HOURS PRN
Status: DISCONTINUED | OUTPATIENT
Start: 2020-06-09 | End: 2020-06-11 | Stop reason: HOSPADM

## 2020-06-09 RX ORDER — MORPHINE SULFATE 2 MG/ML
0.5 INJECTION, SOLUTION INTRAMUSCULAR; INTRAVENOUS
Status: DISCONTINUED | OUTPATIENT
Start: 2020-06-09 | End: 2020-06-11 | Stop reason: HOSPADM

## 2020-06-09 RX ORDER — FAMOTIDINE 10 MG/ML
20 INJECTION, SOLUTION INTRAVENOUS DAILY
Status: DISCONTINUED | OUTPATIENT
Start: 2020-06-09 | End: 2020-06-10

## 2020-06-09 RX ORDER — SODIUM CHLORIDE, SODIUM LACTATE, POTASSIUM CHLORIDE, CALCIUM CHLORIDE 600; 310; 30; 20 MG/100ML; MG/100ML; MG/100ML; MG/100ML
INJECTION, SOLUTION INTRAVENOUS CONTINUOUS PRN
Status: DISCONTINUED | OUTPATIENT
Start: 2020-06-09 | End: 2020-06-09 | Stop reason: SURG

## 2020-06-09 RX ORDER — PROPOFOL 10 MG/ML
VIAL (ML) INTRAVENOUS AS NEEDED
Status: DISCONTINUED | OUTPATIENT
Start: 2020-06-09 | End: 2020-06-09 | Stop reason: SURG

## 2020-06-09 RX ORDER — NALOXONE HCL 0.4 MG/ML
0.1 VIAL (ML) INJECTION
Status: DISCONTINUED | OUTPATIENT
Start: 2020-06-09 | End: 2020-06-11 | Stop reason: HOSPADM

## 2020-06-09 RX ORDER — HYDROMORPHONE HYDROCHLORIDE 1 MG/ML
0.5 INJECTION, SOLUTION INTRAMUSCULAR; INTRAVENOUS; SUBCUTANEOUS
Status: DISCONTINUED | OUTPATIENT
Start: 2020-06-09 | End: 2020-06-09 | Stop reason: HOSPADM

## 2020-06-09 RX ORDER — FAMOTIDINE 10 MG/ML
20 INJECTION, SOLUTION INTRAVENOUS EVERY 12 HOURS SCHEDULED
Status: DISCONTINUED | OUTPATIENT
Start: 2020-06-09 | End: 2020-06-09

## 2020-06-09 RX ORDER — DEXAMETHASONE SODIUM PHOSPHATE 10 MG/ML
INJECTION, SOLUTION INTRAMUSCULAR; INTRAVENOUS
Status: COMPLETED | OUTPATIENT
Start: 2020-06-09 | End: 2020-06-09

## 2020-06-09 RX ORDER — ACETAMINOPHEN 500 MG
1000 TABLET ORAL EVERY 8 HOURS SCHEDULED
Status: DISCONTINUED | OUTPATIENT
Start: 2020-06-09 | End: 2020-06-11 | Stop reason: HOSPADM

## 2020-06-09 RX ORDER — BUPIVACAINE HYDROCHLORIDE 2.5 MG/ML
INJECTION, SOLUTION EPIDURAL; INFILTRATION; INTRACAUDAL
Status: COMPLETED | OUTPATIENT
Start: 2020-06-09 | End: 2020-06-09

## 2020-06-09 RX ORDER — LIDOCAINE HYDROCHLORIDE 10 MG/ML
0.5 INJECTION, SOLUTION EPIDURAL; INFILTRATION; INTRACAUDAL; PERINEURAL ONCE AS NEEDED
Status: DISCONTINUED | OUTPATIENT
Start: 2020-06-09 | End: 2020-06-09 | Stop reason: HOSPADM

## 2020-06-09 RX ORDER — MAGNESIUM HYDROXIDE 1200 MG/15ML
LIQUID ORAL AS NEEDED
Status: DISCONTINUED | OUTPATIENT
Start: 2020-06-09 | End: 2020-06-09 | Stop reason: HOSPADM

## 2020-06-09 RX ORDER — CEFAZOLIN SODIUM 2 G/100ML
2 INJECTION, SOLUTION INTRAVENOUS EVERY 8 HOURS
Status: COMPLETED | OUTPATIENT
Start: 2020-06-10 | End: 2020-06-10

## 2020-06-09 RX ORDER — NEOSTIGMINE METHYLSULFATE 1 MG/ML
INJECTION, SOLUTION INTRAVENOUS AS NEEDED
Status: DISCONTINUED | OUTPATIENT
Start: 2020-06-09 | End: 2020-06-09 | Stop reason: SURG

## 2020-06-09 RX ORDER — FAMOTIDINE 20 MG/1
20 TABLET, FILM COATED ORAL ONCE
Status: DISCONTINUED | OUTPATIENT
Start: 2020-06-09 | End: 2020-06-09 | Stop reason: HOSPADM

## 2020-06-09 RX ORDER — FAMOTIDINE 10 MG/ML
20 INJECTION, SOLUTION INTRAVENOUS ONCE
Status: DISCONTINUED | OUTPATIENT
Start: 2020-06-09 | End: 2020-06-09 | Stop reason: HOSPADM

## 2020-06-09 RX ORDER — GLYCOPYRROLATE 0.2 MG/ML
INJECTION INTRAMUSCULAR; INTRAVENOUS AS NEEDED
Status: DISCONTINUED | OUTPATIENT
Start: 2020-06-09 | End: 2020-06-09 | Stop reason: SURG

## 2020-06-09 RX ORDER — HYDROMORPHONE HYDROCHLORIDE 1 MG/ML
0.5 INJECTION, SOLUTION INTRAMUSCULAR; INTRAVENOUS; SUBCUTANEOUS
Status: DISCONTINUED | OUTPATIENT
Start: 2020-06-09 | End: 2020-06-11 | Stop reason: HOSPADM

## 2020-06-09 RX ORDER — ROCURONIUM BROMIDE 10 MG/ML
INJECTION, SOLUTION INTRAVENOUS AS NEEDED
Status: DISCONTINUED | OUTPATIENT
Start: 2020-06-09 | End: 2020-06-09 | Stop reason: SURG

## 2020-06-09 RX ORDER — ONDANSETRON 2 MG/ML
INJECTION INTRAMUSCULAR; INTRAVENOUS AS NEEDED
Status: DISCONTINUED | OUTPATIENT
Start: 2020-06-09 | End: 2020-06-09 | Stop reason: SURG

## 2020-06-09 RX ORDER — MORPHINE SULFATE 2 MG/ML
1 INJECTION, SOLUTION INTRAMUSCULAR; INTRAVENOUS
Status: DISCONTINUED | OUTPATIENT
Start: 2020-06-09 | End: 2020-06-09

## 2020-06-09 RX ORDER — ASPIRIN 81 MG/1
81 TABLET ORAL EVERY 12 HOURS SCHEDULED
Status: DISCONTINUED | OUTPATIENT
Start: 2020-06-10 | End: 2020-06-11 | Stop reason: HOSPADM

## 2020-06-09 RX ORDER — FENTANYL CITRATE 50 UG/ML
50 INJECTION, SOLUTION INTRAMUSCULAR; INTRAVENOUS
Status: DISCONTINUED | OUTPATIENT
Start: 2020-06-09 | End: 2020-06-09 | Stop reason: HOSPADM

## 2020-06-09 RX ORDER — SODIUM CHLORIDE 0.9 % (FLUSH) 0.9 %
10 SYRINGE (ML) INJECTION AS NEEDED
Status: DISCONTINUED | OUTPATIENT
Start: 2020-06-09 | End: 2020-06-09 | Stop reason: HOSPADM

## 2020-06-09 RX ADMIN — MORPHINE SULFATE 0.5 MG: 2 INJECTION, SOLUTION INTRAMUSCULAR; INTRAVENOUS at 11:15

## 2020-06-09 RX ADMIN — BUPIVACAINE HYDROCHLORIDE 20 ML: 2.5 INJECTION, SOLUTION EPIDURAL; INFILTRATION; INTRACAUDAL; PERINEURAL at 18:19

## 2020-06-09 RX ADMIN — Medication 10 ML: at 00:16

## 2020-06-09 RX ADMIN — PHENYLEPHRINE HYDROCHLORIDE 100 MCG: 10 INJECTION, SOLUTION INTRAMUSCULAR; INTRAVENOUS; SUBCUTANEOUS at 18:23

## 2020-06-09 RX ADMIN — ROCURONIUM BROMIDE 20 MG: 10 SOLUTION INTRAVENOUS at 18:01

## 2020-06-09 RX ADMIN — GLYCOPYRROLATE 0.4 MG: 0.2 INJECTION INTRAMUSCULAR; INTRAVENOUS at 20:07

## 2020-06-09 RX ADMIN — TRANEXAMIC ACID 1000 MG: 100 INJECTION, SOLUTION INTRAVENOUS at 18:23

## 2020-06-09 RX ADMIN — ROCURONIUM BROMIDE 10 MG: 10 SOLUTION INTRAVENOUS at 18:18

## 2020-06-09 RX ADMIN — CEFTRIAXONE 1 G: 1 INJECTION, POWDER, FOR SOLUTION INTRAMUSCULAR; INTRAVENOUS at 22:13

## 2020-06-09 RX ADMIN — CEFAZOLIN SODIUM 2 G: 2 INJECTION, SOLUTION INTRAVENOUS at 18:13

## 2020-06-09 RX ADMIN — SODIUM CHLORIDE, POTASSIUM CHLORIDE, SODIUM LACTATE AND CALCIUM CHLORIDE: 600; 310; 30; 20 INJECTION, SOLUTION INTRAVENOUS at 17:56

## 2020-06-09 RX ADMIN — FAMOTIDINE 20 MG: 10 INJECTION, SOLUTION INTRAVENOUS at 08:10

## 2020-06-09 RX ADMIN — ACETAMINOPHEN 1000 MG: 500 TABLET, FILM COATED ORAL at 22:13

## 2020-06-09 RX ADMIN — DEXAMETHASONE SODIUM PHOSPHATE 2 MG: 10 INJECTION, SOLUTION INTRAMUSCULAR; INTRAVENOUS at 18:19

## 2020-06-09 RX ADMIN — SODIUM CHLORIDE, POTASSIUM CHLORIDE, SODIUM LACTATE AND CALCIUM CHLORIDE 100 ML/HR: 600; 310; 30; 20 INJECTION, SOLUTION INTRAVENOUS at 21:23

## 2020-06-09 RX ADMIN — LISINOPRIL 10 MG: 10 TABLET ORAL at 08:10

## 2020-06-09 RX ADMIN — SODIUM CHLORIDE, POTASSIUM CHLORIDE, SODIUM LACTATE AND CALCIUM CHLORIDE 9 ML/HR: 600; 310; 30; 20 INJECTION, SOLUTION INTRAVENOUS at 17:19

## 2020-06-09 RX ADMIN — TRANEXAMIC ACID 1000 MG: 100 INJECTION, SOLUTION INTRAVENOUS at 19:43

## 2020-06-09 RX ADMIN — SODIUM CHLORIDE 50 ML/HR: 9 INJECTION, SOLUTION INTRAVENOUS at 00:14

## 2020-06-09 RX ADMIN — PHENYLEPHRINE HYDROCHLORIDE 100 MCG: 10 INJECTION, SOLUTION INTRAMUSCULAR; INTRAVENOUS; SUBCUTANEOUS at 19:52

## 2020-06-09 RX ADMIN — SERTRALINE HYDROCHLORIDE 50 MG: 50 TABLET ORAL at 08:10

## 2020-06-09 RX ADMIN — PROPOFOL 70 MG: 10 INJECTION, EMULSION INTRAVENOUS at 18:01

## 2020-06-09 RX ADMIN — METOPROLOL SUCCINATE 25 MG: 25 TABLET, EXTENDED RELEASE ORAL at 08:09

## 2020-06-09 RX ADMIN — LORAZEPAM 0.5 MG: 2 INJECTION INTRAMUSCULAR; INTRAVENOUS at 10:13

## 2020-06-09 RX ADMIN — NEOSTIGMINE 3 MG: 1 INJECTION INTRAVENOUS at 20:07

## 2020-06-09 RX ADMIN — MORPHINE SULFATE 0.5 MG: 2 INJECTION, SOLUTION INTRAMUSCULAR; INTRAVENOUS at 04:17

## 2020-06-09 RX ADMIN — ONDANSETRON 4 MG: 2 INJECTION INTRAMUSCULAR; INTRAVENOUS at 20:06

## 2020-06-09 NOTE — ANESTHESIA PREPROCEDURE EVALUATION
Anesthesia Evaluation     Patient summary reviewed and Nursing notes reviewed   NPO Solid Status: > 8 hours  NPO Liquid Status: > 8 hours           Airway   Mallampati: II  TM distance: >3 FB  Neck ROM: limited  Possible difficult intubation  Dental - normal exam     Pulmonary     breath sounds clear to auscultation  Cardiovascular   Exercise tolerance: poor (<4 METS)    Rhythm: irregular  Rate: normal        Neuro/Psych  GI/Hepatic/Renal/Endo      Musculoskeletal     Abdominal    Substance History      OB/GYN          Other            Phys Exam Other: A fib              Anesthesia Plan    ASA 3     general with block and general     intravenous induction     Anesthetic plan, all risks, benefits, and alternatives have been provided, discussed and informed consent has been obtained with: patient.

## 2020-06-09 NOTE — ANESTHESIA PROCEDURE NOTES
Peripheral Block      Patient reassessed immediately prior to procedure    Patient location during procedure: pre-op  Stop time: 6/9/2020 6:19 PM  Reason for block: procedure for pain and at surgeon's request  Performed by  Anesthesiologist: Humberto Gordon MD  Preanesthetic Checklist  Completed: patient identified, site marked, surgical consent, pre-op evaluation, timeout performed, IV checked, risks and benefits discussed and monitors and equipment checked  Prep:  Pt Position: supine  Sterile barriers:cap, gloves and mask  Prep: ChloraPrep  Patient monitoring: blood pressure monitoring, continuous pulse oximetry and EKG  Procedure  Performed under: local infiltration  Guidance:ultrasound guided  Images:still images obtained, printed/placed on chart    Laterality:right  Block Type:fascia iliaca catheter  Injection Technique:catheter  Needle Type:echogenic  Needle Gauge:18 G  Resistance on Injection: none  Catheter Size:20 G (20g)    Medications Used: bupivacaine PF (MARCAINE) 0.25 % injection, 20 mL  dexamethasone sodium phosphate injection, 2 mg      Medications  Preservative Free Saline:10ml    Post Assessment  Injection Assessment: negative aspiration for heme, no paresthesia on injection and incremental injection  Patient Tolerance:comfortable throughout block  Complications:no  Additional Notes  Procedure:                 Pt placed in supine position.   The insertion site was prepped in sterile fashion with Chlorapreop and clear plastic drapes.  Analgesia was provided by skin infiltration at insertion site with Lidocaine 1% 3mls.  A B-Chaney 18 g , 4 inch echogenic Touhy needle was advance In-plane under ultrasound guidance. The   Anterior superior Iliac crest was initially visualized and the probe was directed slightly medially and slightly towards the umbilicus.  The course of the needle was tracked over the sartorius muscle through the fascia Iliacus and into the anterior portion of the Iliacus muscle.   Major vessels where identified and avoided as where structures of the peritoneal cavity.  LA injection was made incrementally in 1-5ml amounts spread was visualized superiorly below fascia iliacus.  Injection was completed with negative aspiration of blood and negative intravascular injection.  Injection pressures where normal or minimal resistance.  A 20 g B-Chaney wire styleted catheter was then advance thru the needle and very easily placed in a superior or cephalad direction.  The catheter was secured at insertion site with skin afix , mastisol, steristreps.  A CHG tegaderm dressing was placed over the insertion site and the nerve catheter labeled and capped.  Thank You.
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
## 2020-06-09 NOTE — PROGRESS NOTES
Continued Stay Note  [facility]     Patient Name: Yuli Jose  MRN: [id number]  Today's Date: 6/9/2020    Admit Date: 6/8/2020    Discharge Plan     Row Name 06/09/20 1424       Plan    Plan  [facility]    Patient/Family in Agreement with Plan  yes    Plan Comments  Spoke with Melody Cuellar at the State Guardianship office and she has given verbal constent to Xu, patient's nurse by phone. Melody Cuellar [phone] and after hours [phone]. CM will continue to follow.        Discharge Codes    No documentation.             Bi Andersen RN
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
## 2020-06-09 NOTE — PLAN OF CARE
Problem: Fall Risk (Adult)  Goal: Identify Related Risk Factors and Signs and Symptoms  Flowsheets (Taken 6/9/2020 0455)  Related Risk Factors (Fall Risk): age-related changes; confusion/agitation; history of falls; gait/mobility problems  Signs and Symptoms (Fall Risk): presence of risk factors     Problem: Skin Injury Risk (Adult)  Goal: Identify Related Risk Factors and Signs and Symptoms  Flowsheets (Taken 6/9/2020 0455)  Related Risk Factors (Skin Injury Risk): advanced age; cognitive impairment; mobility impaired     Problem: Patient Care Overview  Goal: Plan of Care Review  Flowsheets  Taken 6/9/2020 0455  Progress: no change  Outcome Summary: Pt admitted this shift for right hip fx with posible repair. VSS and WNL. Pt very confused most of shift, calls out for mother and does not know where she is. Reoriented multiple times, but pt remains confused.  Taken 6/8/2020 2000  Plan of Care Reviewed With: patient     Problem: Fracture Orthopaedic (Adult)  Goal: Signs and Symptoms of Listed Potential Problems Will be Absent, Minimized or Managed (Fracture Orthopaedic)  Flowsheets (Taken 6/9/2020 3284)  Problems Assessed (Orthopaedic Fracture): all  Problems Present (Orthopaedic Fracture): functional deficit/self-care deficit; pain; skin integrity impairment

## 2020-06-09 NOTE — PLAN OF CARE
Patient very confused and combative this AM requiring a dose of Ativan. Surgery for right hip hemiarthroplasty scheduled for later this afternoon. Melody Cuellar (State Guardian) called for update. Operative consent and blood transfusion consent obtained at that time plus updated next of Kin information in Epic. VSS. Will continue to monitor for pain and for urinary incontinence.
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
## 2020-06-09 NOTE — NURSING NOTE
Ms. Jose is a veliz of the [facility] and her guardian is Melody Cuellar.  She is a resident at [facility] and [facility].

## 2020-06-09 NOTE — ANESTHESIA PROCEDURE NOTES
Airway  Urgency: elective    Date/Time: 6/9/2020 6:03 PM  End Time:6/9/2020 6:18 PM  Airway not difficult    General Information and Staff    Patient location during procedure: OR  Anesthesiologist: Humberto Gordon MD    Indications and Patient Condition  Indications for airway management: airway protection    Preoxygenated: yes  MILS not maintained throughout  Mask difficulty assessment: 1 - vent by mask    Final Airway Details  Final airway type: endotracheal airway      Successful airway: ETT  Cuffed: yes   Successful intubation technique: direct laryngoscopy  Endotracheal tube insertion site: oral  Blade: Manuel  Blade size: 3  ETT size (mm): 6.5  Cormack-Lehane Classification: grade I - full view of glottis  Placement verified by: chest auscultation and capnometry   Measured from: lips  ETT/EBT  to lips (cm): 20  Number of attempts at approach: 1    Additional Comments  Used glidescope because I could not assess her airway preop  Negative epigastric sounds, Breath sound equal bilaterally with symmetric chest rise and fall

## 2020-06-09 NOTE — PROGRESS NOTES
[facility] Medicine Services  PROGRESS NOTE    Patient Name: Yuli Jose  : 1922  MRN: [id number]    Date of Admission: 2020  Primary Care Physician: Jennie Domínguez MD    Subjective   Subjective     CC:  Hip fracture     HPI:  Patient is confused, pleasantly demented. No family present at bedside.  Anticipating OR today.    Review of Systems  Unable to assess    Objective   Objective     Vital Signs:   Temp:  [97.2 °F (36.2 °C)-98.7 °F (37.1 °C)] 98.1 °F (36.7 °C)  Heart Rate:  [75-99] 99  Resp:  [16-20] 16  BP: (114-139)/(53-85) 139/67        Physical Exam:  GEN- no acute distress noted, pleasant, confused  HEENT- atraumatic, normocephlic, eomi  NECK- supple, trachea midline, no masses  RESP: ctab, normal effort  CV: no murmurs, s1/s2, rrr  MSK: no edema noted, spontaneous movement of all extremities aside from right hip- minimal TTP  NEURO: alert, awake, oriented to self only   SKIN: no rashes  PSYCH: appropriate mood and affect , pleasant       Results Reviewed:  Results from last 7 days   Lab Units 20  0500 20  1633 20  1632   WBC 10*3/mm3 6.00  --  5.27   HEMOGLOBIN g/dL 13.0  --  12.3   HEMATOCRIT % 41.6  --  38.8   PLATELETS 10*3/mm3 254  --  242   INR   --  1.09  --      Results from last 7 days   Lab Units 20  1632   SODIUM mmol/L 140   POTASSIUM mmol/L 4.6   CHLORIDE mmol/L 102   CO2 mmol/L 28.0   BUN mg/dL 28*   CREATININE mg/dL 0.66   GLUCOSE mg/dL 84   CALCIUM mg/dL 8.5   ALT (SGPT) U/L 5   AST (SGOT) U/L 16   TROPONIN T ng/mL <0.010   PROBNP pg/mL 2,499.0*     Estimated Creatinine Clearance: 28.8 mL/min (by C-G formula based on SCr of 0.66 mg/dL).    Microbiology Results Abnormal     Procedure Component Value - Date/Time    COVID PRE-OP / PRE-PROCEDURE SCREENING ORDER (NO ISOLATION) - Swab, Nasopharynx [[id number]] Collected:  20 184    Lab Status:  Final result Specimen:  Swab from Nasopharynx Updated:  20    Narrative:        The following orders were created for panel order COVID PRE-OP / PRE-PROCEDURE SCREENING ORDER (NO ISOLATION) - Swab, Nasopharynx.  Procedure                               Abnormality         Status                     ---------                               -----------         ------                     COVID-19,CEPHEID,CLIFFORD IN-...[[id number]]  Normal              Final result                 Please view results for these tests on the individual orders.    COVID-19,CEPHEID,CLIFFORD IN-HOUSE(OR EMERGENT/ADD-ON),NP SWAB IN TRANSPORT MEDIA 3-4 HR TAT - Swab, Nasopharynx [[id number]]  (Normal) Collected:  06/08/20 1841    Lab Status:  Final result Specimen:  Swab from Nasopharynx Updated:  06/08/20 1941     COVID19 Not Detected          Imaging Results (Last 24 Hours)     Procedure Component Value Units Date/Time    XR Foot 3+ View Right [[id number]] Collected:  06/09/20 0456     Updated:  06/09/20 0458    Narrative:       RIGHT FOOT, 6/9/2020      HISTORY:    89-year-old female in the ED with pain after multiple falls.      TECHNIQUE:  Three-view right foot series.      FINDINGS:  No fracture, dislocation or other acute osseous abnormality is demonstrated. Demineralization suggesting osteoporosis. Probable hammertoe deformities.      Impression:       No acute osseous abnormality.    Signer Name: Bi Costa MD   Signed: 6/9/2020 4:56 AM   Workstation Name: [id number]    [facility]    XR Wrist 3+ View Right [[id number]] Collected:  06/09/20 0455     Updated:  06/09/20 0457    Narrative:       RIGHT WRIST, 6/9/2020      HISTORY:    89-year-old female in the ED with pain after multiple falls.      TECHNIQUE:  Three-view right wrist series.      FINDINGS:  No fracture, dislocation or other acute osseous abnormality is demonstrated.  Previous resection of the distal ulna. Chronic degenerative arthropathy predominantly involving the radiocarpal articulation. There is chronic tendon sheath  calcification along the medial extensor tendons. Demineralization.      Impression:       1.  No acute osseous abnormality.  2.  Previous distal ulnar resection. Degenerative wrist arthropathy.  3.  Chronic extensor tendon sheath calcification.    Signer Name: Bi Costa MD   Signed: 6/9/2020 4:55 AM   Workstation Name: [facility]    [facility]    XR Elbow 2 View Right [[id number]] Collected:  06/09/20 0453     Updated:  06/09/20 0456    Narrative:       RIGHT ELBOW, 6/9/2020      HISTORY:    89-year-old female in the ED with pain after multiple falls.      TECHNIQUE:  Limited two view right elbow series.      FINDINGS:  No acute fracture or dislocation is demonstrated.    Extensive old healed fracture deformity of the distal humerus status post prior plate and screw fixation. There is also a longitudinal screw and adjacent cortical wire traversing the olecranon process of the ulna. Secondary degenerative arthropathy  involving the elbow joint.      Impression:       1. No acute osseous abnormality.  2. Old posttraumatic and postoperative changes as noted above.    Signer Name: Bi Costa MD   Signed: 6/9/2020 4:53 AM   Workstation Name: [facility]    [facility]    XR Chest 1 View [[id number]] Collected:  06/08/20 1637     Updated:  06/08/20 1908    Narrative:       EXAMINATION: XR CHEST 1 VW- 06/08/2020     INDICATION: FALL      COMPARISON: NONE     FINDINGS: Cardiac size enlarged. Pulmonary vascularity in normal limits  and without overt edema. No focal opacification or consolidation with  right hemidiaphragm asymmetrically elevated. Probable trace right  pleural effusion. Degenerative changes of the spine. Loop recorder  overlies the left chest.           Impression:       Asymmetric elevation right hemidiaphragm with trace right  pleural effusion however no pneumothorax.     D:  06/08/2020  E:  06/08/2020     This report was finalized on  6/8/2020 7:05 PM by Dr. Chase Knowles.       XR Hip With or Without Pelvis 2 - 3 View Right [[id number]] Collected:  06/08/20 1635     Updated:  06/08/20 1908    Narrative:       EXAMINATION: XR HIP W OR WO PELVIS 2-3 VIEW RIGHT- 06/08/2020     INDICATION: TRAUMA      COMPARISON: NONE     FINDINGS: Single AP view of the pelvis along with AP and lateral views  of the right hip reveal an SI joint symmetric and without widening. No  displaced pelvic ring fracture however irregularity in the right  inferior pubic ramus somewhat well corticated may represent prior healed  injury although age indeterminate. Right hip is well located in regards  to the right femoral head however abnormal cortical irregularity in the  subcapital region with a somewhat foreshortened appearance may represent  compacted nondisplaced subcapital fracture or involvement of the femoral  head in regards to fracture line seen best on lateral view of the hip  without protrusio. Left hip hardware remains in place.       Impression:       1. Abnormal foreshortening of the right proximal femur with cortical  irregularity in the subcapital region of likely nondisplaced subcapital  fracture versus femoral head involvement without displacement from the  acetabular cup or protrusio.  2. Irregularity and cortical margins of the right inferior pubic ramus  age-indeterminate minimally displaced fracture.     D:  06/08/2020  E:  06/08/2020     This report was finalized on 6/8/2020 7:05 PM by Dr. Chase Knowles.                  I have reviewed the medications:  Scheduled Meds:  cefTRIAXone 1 g Intravenous Q24H   docusate sodium 100 mg Oral Daily   famotidine 20 mg Intravenous Daily   insulin lispro 0-7 Units Subcutaneous TID AC   lisinopril 10 mg Oral Q24H   metoprolol succinate XL 25 mg Oral Daily   sertraline 50 mg Oral Daily   sodium chloride 10 mL Intravenous Q12H   sodium chloride 10 mL Intravenous Q12H     Continuous Infusions:  lactated ringers 9 mL/hr Last  Rate: 9 mL/hr (06/08/20 2114)   sodium chloride 50 mL/hr Last Rate: 50 mL/hr (06/09/20 0014)     PRN Meds:.•  acetaminophen  •  dextrose  •  dextrose  •  glucagon (human recombinant)  •  lidocaine PF 1%  •  melatonin  •  Morphine  •  ondansetron  •  polyethylene glycol  •  sodium chloride  •  sodium chloride    Assessment/Plan   Assessment & Plan     Active Hospital Problems    Diagnosis  POA   • **Closed subcapital fracture of right femur (CMS/[facility]) [S72.011A]  Yes   • Acute UTI (urinary tract infection) [N39.0]  Yes   • Atrial fibrillation (CMS/[facility]) [I48.91]  Yes   • Diabetes mellitus (CMS/[facility]) [E11.9]  Yes   • Hyperlipidemia [E78.5]  Yes   • Hypertension [I10]  Yes      Resolved Hospital Problems   No resolved problems to display.        Brief Hospital Course to date:  Yuli Jose is a 89 y.o. female with history of pAF, HTN, T2DM, dementia who presents from rehab with fall and found to have right subcapital femoral fx. Of note, patient recovering from left hip fracture over at [facility] and rehab. Also noted to have UTI. Patient will be admitted to hospitalist service for further w/u and management with orthopedics consulting.     Right subcapital femoral fx  - secondary to mechanical fall  - no complaints of pain. Opted to forego nerve block; risk> benefit  - Dr. Vasquez to see per orthopedic surgery in am, covid screen done and negative  - NPO after midnight  - PT/OT  - am labs    UTI  - continue rocephin   - appears dry. No urine output. Gently hydrate with saline 50 cc/hr x 8 hours    Chronic atrial fibrillation:  - stable and rate controlled  - [id number] = 5. Not anticoagulated. Likely due to age, falls risk    Hypertension:  - controlled and stable  - continue metoprolol, lisinopril     Dementia:  - history of sundowning. Monitor  - antipsychotics as needed-- prn ativan today as noted QTC is prolonged, keep close monitoring    Prolonged QTc  - noted on EKG at 514, monitor closely, will check in AM  -  "avoid prolonging agents       DVT Prophylaxis:  [facility] given likely surgical needs     Daily Care Communication  Due to current limited visitation policies, an attempt will be made daily to update patient's identified best point-of-contact(s)   Contact: Shweta Rolan   Relation: daughter   Type of communication (phone or televideo): phone   Time of communication: 1025am   Notes (if applicable): updated her to plan of care to surgery this afternoon    Of note, daughter states that patient frequently has trouble with narcotics- they make her \"loopy\" and she would like us to avoid these if able.     Disposition: I expect the patient to be discharged pending surgical repair, will need rehab.    CODE STATUS:   Code Status and Medical Interventions:   Ordered at: 06/08/20 2202     Limited Support to NOT Include:    Intubation     Level Of Support Discussed With:    Patient     Code Status:    No CPR     Medical Interventions (Level of Support Prior to Arrest):    Limited         Electronically signed by Ольга Garcia MD, 06/09/20, 07:33.      "

## 2020-06-09 NOTE — NURSING NOTE
WOC nurse for Ernie report 15 or less    Spoke with ELLE Mcnair - patient to have hip surgery today.  Plan is to have patient up with PT/OT as soon as possible.    No pressure injury skin issues noted, all skin interventions in place, including heel boots, repositioning and waffle.    Please consult WOC nurse if needed.Pressure injury prevention orders placed per protocol.  Including floating heels with Heel boots, repositioning q 2 hour with use of wedge, seat cushion, HOB less than or equal to 30 degrees and moisture barrier cream on coccyx/buttocks as needed.  Heel boots come in 3 sizes Petit, Standard and Bariatric.  Use the measuring guide found in the heel boot packaging for guidelines for appropriate size.      Please consult WOC nurse if needed, Thank you

## 2020-06-09 NOTE — PROGRESS NOTES
Discharge Planning Assessment  [facility]     Patient Name: Yuli Jose  MRN: [id number]  Today's Date: 6/9/2020    Admit Date: 6/8/2020    Discharge Needs Assessment     Row Name 06/09/20 0851       Living Environment    Lives With  facility resident    Unique Family Situation  [facility]    Current Living Arrangements  extended care facility    Primary Care Provided by  other (see comments)    Provides Primary Care For  no one, unable/limited ability to care for self    Family Caregiver if Needed  child(radha), adult    Family Caregiver Names  Shweta Gongora (daughter) [phone]    Able to Return to Prior Arrangements  yes       Resource/Environmental Concerns    Resource/Environmental Concerns  none    Transportation Concerns  other (see comments)       Transition Planning    Patient/Family Anticipates Transition to  long term care facility    Patient/Family Anticipated Services at Transition  none    Transportation Anticipated  other (see comments)       Discharge Needs Assessment    Readmission Within the Last 30 Days  no previous admission in last 30 days    Concerns to be Addressed  denies needs/concerns at this time    Equipment Currently Used at Home  walker, rolling;wheelchair    Anticipated Changes Related to Illness  none    Equipment Needed After Discharge  none    Discharge Facility/Level of Care Needs  nursing facility, basic        Discharge Plan     Row Name 06/09/20 0852       Plan    Plan  [facility]    Patient/Family in Agreement with Plan  yes    Plan Comments  Spoke with Shweta Gongora (daughter) by phone. Lives at [facility] in [place]. Contact is Shweta Gongora (daughter) [phone]. Is independent with ADL's. Has a rolling walker and wheelchair at home. Plan is [facility]. CM will continue to follow.    Final Discharge Disposition Code  04 - intermediate care facility        Destination      Coordination has not been started for this  encounter.      Durable Medical Equipment      Coordination has not been started for this encounter.      Dialysis/Infusion      Coordination has not been started for this encounter.      Home Medical Care      Coordination has not been started for this encounter.      Therapy      Coordination has not been started for this encounter.      Community Resources      Coordination has not been started for this encounter.          Demographic Summary     Row Name 06/09/20 0850       General Information    Admission Type  inpatient    Arrived From  emergency department    Referral Source  admission list    Reason for Consult  discharge planning    Preferred Language  English     Used During This Interaction  no       Contact Information    Permission Granted to Share Info With      Contact Information Obtained for      Contact Information Comments  PCP is Jennie Domínguez MD        Functional Status     Row Name 06/09/20 0850       Functional Status    Usual Activity Tolerance  fair    Current Activity Tolerance  poor       Functional Status, IADL    Medications  completely dependent    Meal Preparation  completely dependent    Housekeeping  completely dependent    Laundry  completely dependent    Shopping  completely dependent       Mental Status    General Appearance WDL  WDL       Mental Status Summary    Recent Changes in Mental Status/Cognitive Functioning  no changes       Employment/    Employment Status  retired        Psychosocial    No documentation.       Abuse/Neglect    No documentation.       Legal    No documentation.       Substance Abuse    No documentation.       Patient Forms    No documentation.           Bi Andersen RN

## 2020-06-09 NOTE — SIGNIFICANT NOTE
Melody Cuellar called at 1350 to check on Yuli Jose. She informed nurse- Xu, that she has State guardianship of Mrs. Jose and no one has called to give her any updates. Mrs Cuellar was informed that her information was not listed on patient's current next of kin. She stated that the daughter has not been in contact with Mrs. Jose for several years, although Shweta Gongora answered her phone today when verbal consent was obtained. No State guardianship was mentioned by Daughter- Shweta when she gave permission for her mother's surgery. Melody Cuellar' information was recorded and verbal consent for right hip hemiarthroplasty and blood consent were obtained. New contact info updated, charge nurse, , and unit clinical coordinator notified as well.

## 2020-06-10 LAB
ANION GAP SERPL CALCULATED.3IONS-SCNC: 11 MMOL/L (ref 5–15)
BACTERIA SPEC AEROBE CULT: NO GROWTH
BASOPHILS # BLD AUTO: 0.02 10*3/MM3 (ref 0–0.2)
BASOPHILS NFR BLD AUTO: 0.2 % (ref 0–1.5)
BUN BLD-MCNC: 22 MG/DL (ref 8–23)
BUN/CREAT SERPL: 27.2 (ref 7–25)
CALCIUM SPEC-SCNC: 8.5 MG/DL (ref 8.2–9.6)
CHLORIDE SERPL-SCNC: 103 MMOL/L (ref 98–107)
CO2 SERPL-SCNC: 23 MMOL/L (ref 22–29)
CREAT BLD-MCNC: 0.81 MG/DL (ref 0.57–1)
DEPRECATED RDW RBC AUTO: 60.2 FL (ref 37–54)
EOSINOPHIL # BLD AUTO: 0 10*3/MM3 (ref 0–0.4)
EOSINOPHIL NFR BLD AUTO: 0 % (ref 0.3–6.2)
ERYTHROCYTE [DISTWIDTH] IN BLOOD BY AUTOMATED COUNT: 15.9 % (ref 12.3–15.4)
GFR SERPL CREATININE-BSD FRML MDRD: 65 ML/MIN/1.73
GLUCOSE BLD-MCNC: 222 MG/DL (ref 65–99)
GLUCOSE BLDC GLUCOMTR-MCNC: 138 MG/DL (ref 70–130)
GLUCOSE BLDC GLUCOMTR-MCNC: 166 MG/DL (ref 70–130)
GLUCOSE BLDC GLUCOMTR-MCNC: 173 MG/DL (ref 70–130)
HCT VFR BLD AUTO: 35.6 % (ref 34–46.6)
HGB BLD-MCNC: 11.1 G/DL (ref 12–15.9)
IMM GRANULOCYTES # BLD AUTO: 0.05 10*3/MM3 (ref 0–0.05)
IMM GRANULOCYTES NFR BLD AUTO: 0.5 % (ref 0–0.5)
LYMPHOCYTES # BLD AUTO: 0.61 10*3/MM3 (ref 0.7–3.1)
LYMPHOCYTES NFR BLD AUTO: 5.7 % (ref 19.6–45.3)
MCH RBC QN AUTO: 32.1 PG (ref 26.6–33)
MCHC RBC AUTO-ENTMCNC: 31.2 G/DL (ref 31.5–35.7)
MCV RBC AUTO: 102.9 FL (ref 79–97)
MONOCYTES # BLD AUTO: 0.46 10*3/MM3 (ref 0.1–0.9)
MONOCYTES NFR BLD AUTO: 4.3 % (ref 5–12)
NEUTROPHILS # BLD AUTO: 9.62 10*3/MM3 (ref 1.7–7)
NEUTROPHILS NFR BLD AUTO: 89.3 % (ref 42.7–76)
NRBC BLD AUTO-RTO: 0 /100 WBC (ref 0–0.2)
PLATELET # BLD AUTO: 233 10*3/MM3 (ref 140–450)
PMV BLD AUTO: 10.5 FL (ref 6–12)
POTASSIUM BLD-SCNC: 5.1 MMOL/L (ref 3.5–5.2)
RBC # BLD AUTO: 3.46 10*6/MM3 (ref 3.77–5.28)
SODIUM BLD-SCNC: 137 MMOL/L (ref 136–145)
WBC NRBC COR # BLD: 10.76 10*3/MM3 (ref 3.4–10.8)

## 2020-06-10 PROCEDURE — 97167 OT EVAL HIGH COMPLEX 60 MIN: CPT

## 2020-06-10 PROCEDURE — 80048 BASIC METABOLIC PNL TOTAL CA: CPT | Performed by: ORTHOPAEDIC SURGERY

## 2020-06-10 PROCEDURE — 97162 PT EVAL MOD COMPLEX 30 MIN: CPT

## 2020-06-10 PROCEDURE — 25010000003 CEFAZOLIN IN DEXTROSE 2-4 GM/100ML-% SOLUTION: Performed by: ORTHOPAEDIC SURGERY

## 2020-06-10 PROCEDURE — 93010 ELECTROCARDIOGRAM REPORT: CPT | Performed by: INTERNAL MEDICINE

## 2020-06-10 PROCEDURE — 82962 GLUCOSE BLOOD TEST: CPT

## 2020-06-10 PROCEDURE — 99232 SBSQ HOSP IP/OBS MODERATE 35: CPT | Performed by: INTERNAL MEDICINE

## 2020-06-10 PROCEDURE — 25010000002 CEFTRIAXONE PER 250 MG: Performed by: ORTHOPAEDIC SURGERY

## 2020-06-10 PROCEDURE — 93005 ELECTROCARDIOGRAM TRACING: CPT | Performed by: INTERNAL MEDICINE

## 2020-06-10 PROCEDURE — 0SRR0J9 REPLACEMENT OF RIGHT HIP JOINT, FEMORAL SURFACE WITH SYNTHETIC SUBSTITUTE, CEMENTED, OPEN APPROACH: ICD-10-PCS | Performed by: ORTHOPAEDIC SURGERY

## 2020-06-10 PROCEDURE — 85025 COMPLETE CBC W/AUTO DIFF WBC: CPT | Performed by: ORTHOPAEDIC SURGERY

## 2020-06-10 PROCEDURE — 63710000001 INSULIN LISPRO (HUMAN) PER 5 UNITS: Performed by: ORTHOPAEDIC SURGERY

## 2020-06-10 PROCEDURE — 25010000002 HYDROMORPHONE PER 4 MG: Performed by: ORTHOPAEDIC SURGERY

## 2020-06-10 PROCEDURE — 25010000002 LORAZEPAM PER 2 MG: Performed by: NURSE PRACTITIONER

## 2020-06-10 PROCEDURE — 97530 THERAPEUTIC ACTIVITIES: CPT

## 2020-06-10 RX ORDER — LORAZEPAM 2 MG/ML
0.25 INJECTION INTRAMUSCULAR ONCE
Status: COMPLETED | OUTPATIENT
Start: 2020-06-10 | End: 2020-06-10

## 2020-06-10 RX ORDER — FAMOTIDINE 20 MG/1
20 TABLET, FILM COATED ORAL DAILY
Status: DISCONTINUED | OUTPATIENT
Start: 2020-06-11 | End: 2020-06-11 | Stop reason: HOSPADM

## 2020-06-10 RX ADMIN — ASPIRIN 81 MG: 81 TABLET, COATED ORAL at 21:13

## 2020-06-10 RX ADMIN — SODIUM CHLORIDE, POTASSIUM CHLORIDE, SODIUM LACTATE AND CALCIUM CHLORIDE 100 ML/HR: 600; 310; 30; 20 INJECTION, SOLUTION INTRAVENOUS at 21:26

## 2020-06-10 RX ADMIN — ACETAMINOPHEN 1000 MG: 500 TABLET, FILM COATED ORAL at 21:13

## 2020-06-10 RX ADMIN — DOCUSATE SODIUM 100 MG: 100 CAPSULE, LIQUID FILLED ORAL at 08:25

## 2020-06-10 RX ADMIN — POLYETHYLENE GLYCOL 3350 17 G: 17 POWDER, FOR SOLUTION ORAL at 08:24

## 2020-06-10 RX ADMIN — CEFTRIAXONE 1 G: 1 INJECTION, POWDER, FOR SOLUTION INTRAMUSCULAR; INTRAVENOUS at 21:13

## 2020-06-10 RX ADMIN — SERTRALINE HYDROCHLORIDE 50 MG: 50 TABLET ORAL at 08:25

## 2020-06-10 RX ADMIN — SODIUM CHLORIDE, POTASSIUM CHLORIDE, SODIUM LACTATE AND CALCIUM CHLORIDE 100 ML/HR: 600; 310; 30; 20 INJECTION, SOLUTION INTRAVENOUS at 10:26

## 2020-06-10 RX ADMIN — SODIUM CHLORIDE, PRESERVATIVE FREE 10 ML: 5 INJECTION INTRAVENOUS at 21:13

## 2020-06-10 RX ADMIN — CEFAZOLIN SODIUM 2 G: 2 INJECTION, SOLUTION INTRAVENOUS at 01:53

## 2020-06-10 RX ADMIN — LORAZEPAM 0.25 MG: 2 INJECTION INTRAMUSCULAR; INTRAVENOUS at 01:16

## 2020-06-10 RX ADMIN — CEFAZOLIN SODIUM 2 G: 2 INJECTION, SOLUTION INTRAVENOUS at 10:26

## 2020-06-10 RX ADMIN — ACETAMINOPHEN 1000 MG: 500 TABLET, FILM COATED ORAL at 13:36

## 2020-06-10 RX ADMIN — INSULIN LISPRO 2 UNITS: 100 INJECTION, SOLUTION INTRAVENOUS; SUBCUTANEOUS at 08:24

## 2020-06-10 RX ADMIN — TRAMADOL HYDROCHLORIDE 50 MG: 50 TABLET, FILM COATED ORAL at 08:32

## 2020-06-10 RX ADMIN — INSULIN LISPRO 2 UNITS: 100 INJECTION, SOLUTION INTRAVENOUS; SUBCUTANEOUS at 17:28

## 2020-06-10 RX ADMIN — LISINOPRIL 10 MG: 10 TABLET ORAL at 08:25

## 2020-06-10 RX ADMIN — ASPIRIN 81 MG: 81 TABLET, COATED ORAL at 08:25

## 2020-06-10 RX ADMIN — METOPROLOL SUCCINATE 25 MG: 25 TABLET, EXTENDED RELEASE ORAL at 08:25

## 2020-06-10 RX ADMIN — HYDROMORPHONE HYDROCHLORIDE 0.5 MG: 1 INJECTION, SOLUTION INTRAMUSCULAR; INTRAVENOUS; SUBCUTANEOUS at 02:17

## 2020-06-10 RX ADMIN — FAMOTIDINE 20 MG: 10 INJECTION, SOLUTION INTRAVENOUS at 08:25

## 2020-06-10 NOTE — PLAN OF CARE
Problem: Patient Care Overview  Goal: Plan of Care Review  Flowsheets  Taken 6/10/2020 1028  Plan of Care Reviewed With: patient  Taken 6/10/2020 1021  Outcome Summary: PT eval performed, pt requires max assist x 2 to transition to eob, transferred bed to chair with mod assist x 2, able to wt bear and take a few steps, confused but can follow simple directives. Recommend snf for rehab

## 2020-06-10 NOTE — OP NOTE
HIP HEMIARTHROPLASTY  Procedure Report    Patient Name:  Yuli Jose  YOB: 1922    Date of Surgery:  6/9/2020     Indications:    89 y.o. female who was [facility] following a fall from standing with significant pain and difficulty ambulating. X-rays revealed a displaced femoral neck fracture.  The patient was indicated for a hip hemiarthroplasty arthroplasty. Likely risk benefits of the procedure including but not limited to infection, DVT, pulmonary embolism, leg length discrepancy, recurrent dislocation, possibility of injury to nerves and vessels, and periprosthetic fractures have been discussed with the patient and her daughter in detail. Despite the risks involved the patient elected to proceed with an informed consent was obtained.     Patient Identification:  Patient was seen in the prep, consent was reviewed, operative procedure was identified, surgical site and thigh marked.          Pre-op Diagnosis:   Closed right hip fracture (CMS/HCC) [369631]       Post-Op Diagnosis Codes:     * Closed right hip fracture (CMS/HCC) [S72.001A]    Procedure/CPT® Codes:      Procedure(s):  HIP HEMIARTHROPLASTY ANTERIOR    Staff:  Surgeon(s):  Obey Vasquez MD    Assistant: Abbi Hill PA    Anesthesia: Choice    Estimated Blood Loss: 300    Implants:    Implant Name Type Inv. Item Serial No.  Lot No. LRB No. Used   SUT CONTRL TISS STRATAFIX SYMM PDS PLUS NORMA CT-1 45CM - [id number] Implant SUT CONTRL TISS STRATAFIX SYMM PDS PLUS NORMA CT-1 45CM  [employer]  DIV OF J AND J [id number] Right 1   STEM FEM/HIP SIRIUS SZ 44/F - [id number] Implant STEM FEM/HIP SIRIUS SZ 44/F  EMANUEL [id number] Right 1   KT BONE BIO PREP 6EA C/S - [id number] Implant KT BONE BIO PREP 6EA C/S  DIALLO JACKIE [id number] Right 1   CMT BONE SIMPLEX/P TMYCIN FDOS 10PK - [id number] Implant CMT BONE SIMPLEX/P TMYCIN FDOS 10PK  DIALLO JACKIE [id number] Right 1   CMT BONE SIMPLEX/P TMYCIN FDOS 10PK -  [id number] Implant CMT BONE SIMPLEX/P TMYCIN FDOS 10PK  [employer] [id number] Right 1   HD FEM MOD COCR 28MM STD/NK - [id number] Implant HD FEM MOD COCR 28MM STD/NK  EMANU[employer] [id number] Right 1   CUP RNGLC BIPOL 28 46MM - [id number] Implant CUP RNGLC BIPOL 28 46MM  EMANUEL [id number] Right 1       Specimen:          None      Findings: Displaced femoral neck fracture    Complications: None    Description of Procedure:   The patient was transferred to [facility] operating room preoperative antibiotics Given IV Prior to Skin Incision As Well As 1 G of Tranexemic Acid. Patient Received general anesthesia and was transferred to the [facility].  Anesthesia did perform a fascia iliac a block prior to proceeding with surgery.  All Bony Prominences Were Padded Adequately. The Operative Hip Was Then Prepped and Draped in the Usual Sterile Fashion. Multiple timeouts Were Done Identifying the Correct Patient Surgical Site and Planned Procedure.  A Skin Incision Was Made Overlying the Anterior Lateral Aspect of the Hip for about 10 Cm Just below the Lateral to the Anterior Superior Iliac Spine. Skin and Subcutaneous Tissue and Fascia Was Incised in Line with the Skin Incision Overlying the Tensor Fascia Zita Muscle. The Tensor Muscle Was Then Retracted Laterally and the Interval between Sartorius and Rectus Femoris Medially and the Tensor Fascia Muscle Were Developed. The Lateral Femoral Circumflex Vessels Were Identified They Were Ligated without Difficulty. The deep Fascia Was Incised and the Capsule of the Hip Joint Where Was Identified. We then placed a soft tissue protecting device deep to the rectus and the tensor. Retractors were then Placed Extracapsular the Capsule Was Then Incised in a T-Shaped Manner and the Anterior Posterior Capsules Were Then Tied with #2 FiberWire. Following This Retractors Were Placed Intracapsularly. We Did Identify the Obvious signs of severe osteoarthritis upon Incising the  Capsule. We Then Made Appropriate Releases Done on the Inferior Medial Neck and along the Saddle of the Femoral Neck Femoral Neck Remaining Was Identified and Osteotomy Was Done and According to the Preoperative Templating Is an Oscillating Saw. The Femoral Head and Cut Neck with Extracted Using a Corkscrew without Difficulty the Femoral Head Did Have some minor Signs of Articular Cartilage Loss Loss or Superior Wear.  The Acetabular Cavity Was Exposed by Placing Retractors and taking Care to Protect the Anterior vascular Structures  We then sized the acetabulum to be a 46 mm  Attention Was Then Directed to the Proximal Femur the Proximal Femur Was Delivered Using a Trochanteric Hook Placed Just Distal to the Vastus Tubercle and Proximal to the gluet Román Tendon. The leg was then Externally Rotated Gross Traction Released and Hyperextension and Adduction Was Done Using the Dian Table. Mechanical Lift on the Table Was Utilized to Support the Femur Appropriate Capsular Releases Were Made to Expose the Medial [place] of the Greater Trochanter and Proximal Femur Was Delivered the Femoral Canal Was Then Entered by Removing Lateral Femoral Neck and with a Box Chisel by Serial Broaching Adequate Fit Was Found to Be Obtained with the Size 44F broach. We then trialed a standard neck was reduced fluoroscopic imaging was used to assess leg length and offset was found to be symmetric. Broach trials were then removed cement restrictor was placed distally the femoral canal was prepped was thorough irrigation followed by a peroxide soak followed by a epinephrine-soaked. Antibiotic cement was inserted in a retrograde-fashion and pressurization was done prior to implanting the implant. A 44F sirius stem was implanted in appropriate anteversion. It sat very similar to our broach trial we chose the std neck. This was then reduced again fluoroscopy remaining images both lateral and AP of the femur showed the stem to be in good  position AP pelvis showed symmetric leg length and offset. There are no eye showed an acute fractures there and general was then thoroughly irrigated saline we did use of more of the injection cocktail. Betadine irrigation was used followed by saline irrigation. Soft tissue hemostasis was secured and topical TXA was used. Sponge and needle instrument counts were correct FiberWire sutures directly anterior and posterior capsular flaps were tied to each other then closed the fascial layer with a running #2 strata fix followed by 2-0 Vicryl and then Monocryl for the skin and Dermabond h over the top. Once the Dermabond had dried Mepilex AG dressing placed over the top. The patient was then transferred to the recovery room in stable condition patient tolerated the procedure well there were no complications the patient adequate distal pulses and good cap refill.  The patient will be mobilized by physical therapy received antibiotic prophylaxis postoperatively for 2 more doses given the first 24 hours. The patient was started on DVT prophylaxis with 81 mg aspirin twice daily on starting postoperative day #1.  I discussed the satisfactory performance of the procedure with the patient's family and discussed the postoperative management.      Assistant Participation:  Surgeon(s):  Obey Vasquez MD    Assistant: Abbi Hill PA assisted with proper preoperative positioning, preoperative templating, determingin availability of proper implants, prepping and draping of patient, manipulation placement of instruments, protection of ligaments and vital soft tissue structures, assistance in maintaining hemostasis and assistance with closure of the wound. Their skills and knowledge of the steps of operation and the desired outcome of each surgical step was crucial, allowing for efficient choreography of surgical procedure, and closure of the wound which lead to reduced surgical time, less blood loss, and less risk of  complications for the patient.         Obey Vasquez MD     Date: 6/9/2020  Time: 20:03

## 2020-06-10 NOTE — PROGRESS NOTES
[facility] Medicine Services  PROGRESS NOTE    Patient Name: Yuli Jose  : 1922  MRN: [id number]    Date of Admission: 2020  Primary Care Physician: Jennie Domínguez MD    Subjective   Subjective     CC:  Hip fx    HPI:  Pleasant, confused at baseline. Denies any pain currently    Review of Systems  Limited ros secondary to baseline dementia    Objective   Objective     Vital Signs:   Temp:  [97.2 °F (36.2 °C)-98.5 °F (36.9 °C)] 97.2 °F (36.2 °C)  Heart Rate:  [] 84  Resp:  [14-18] 16  BP: ()/(47-85) 113/64        Physical Exam:  Constitutional: No acute distress, awake, alert  HENT: NCAT, mucous membranes moist  Respiratory: Clear to auscultation bilaterally, respiratory effort normal   Cardiovascular: RRR, no murmurs   Gastrointestinal: Positive bowel sounds, soft, nontender, nondistended  Musculoskeletal: No bilateral ankle edema  Psychiatric: Appropriate affect, cooperative  Neurologic: confused, oriented to self only  Skin: No rashes    Results Reviewed:  Results from last 7 days   Lab Units 06/10/20  0417 20  0500 20  1633 20  1632   WBC 10*3/mm3 10.76 6.00  --  5.27   HEMOGLOBIN g/dL 11.1* 13.0  --  12.3   HEMATOCRIT % 35.6 41.6  --  38.8   PLATELETS 10*3/mm3 233 254  --  242   INR   --   --  1.09  --      Results from last 7 days   Lab Units 06/10/20  0417 20  0632 20  1632   SODIUM mmol/L 137 135* 140   POTASSIUM mmol/L 5.1 4.7 4.6   CHLORIDE mmol/L 103 102 102   CO2 mmol/L 23.0 20.0* 28.0   BUN mg/dL 22 20 28*   CREATININE mg/dL 0.81 0.52* 0.66   GLUCOSE mg/dL 222* 74 84   CALCIUM mg/dL 8.5 8.7 8.5   ALT (SGPT) U/L  --   --  5   AST (SGOT) U/L  --   --  16   TROPONIN T ng/mL  --   --  <0.010   PROBNP pg/mL  --   --  2,499.0*     Estimated Creatinine Clearance: 28.5 mL/min (by C-G formula based on SCr of 0.81 mg/dL).    Microbiology Results Abnormal     Procedure Component Value - Date/Time    Urine Culture - Urine, Urine,  Catheter [[id number]]  (Normal) Collected:  06/08/20 1740    Lab Status:  Final result Specimen:  Urine, Catheter Updated:  06/10/20 0233     Urine Culture No growth    COVID PRE-OP / PRE-PROCEDURE SCREENING ORDER (NO ISOLATION) - Swab, Nasopharynx [[id number]] Collected:  06/08/20 1841    Lab Status:  Final result Specimen:  Swab from Nasopharynx Updated:  06/08/20 1941    Narrative:       The following orders were created for panel order COVID PRE-OP / PRE-PROCEDURE SCREENING ORDER (NO ISOLATION) - Swab, Nasopharynx.  Procedure                               Abnormality         Status                     ---------                               -----------         ------                     COVID-19,CEPHEID,[facility] IN-...[[id number]]  Normal              Final result                 Please view results for these tests on the individual orders.    COVID-19,CEPHEID,[facility] IN-HOUSE(OR EMERGENT/ADD-ON),NP SWAB IN TRANSPORT MEDIA 3-4 HR TAT - Swab, Nasopharynx [[id number]]  (Normal) Collected:  06/08/20 1841    Lab Status:  Final result Specimen:  Swab from Nasopharynx Updated:  06/08/20 1941     COVID19 Not Detected          Imaging Results (Last 24 Hours)     Procedure Component Value Units Date/Time    XR Hip With or Without Pelvis 2 - 3 View Right [[id number]] Collected:  06/10/20 0830     Updated:  06/10/20 0833    Narrative:       EXAMINATION: XR HIP W OR WO PELVIS 2-3 VIEW RIGHT-      INDICATION: Postop hip arthroplasty; S72.011A-Unspecified intracapsular  fracture of right femur, initial encounter for closed fracture;  F03.90-Unspecified dementia without behavioral disturbance;  Z91.81-History of falling.      COMPARISON: None.     FINDINGS: Imaging of the pelvis and two views of the right hip reveal  patient to be status post replacement of the right hip with postsurgical  changes seen in the soft tissues. No hardware complication identified.  Satisfactory alignment of the prosthesis. Osteopenia of the bony  structures.            Impression:       Status post right hip replacement with no hardware  complication. Postsurgical changes are seen within the soft tissues.     D:  06/10/2020  E:  06/10/2020       FL C Arm During Surgery [[id number]] Collected:  06/10/20 0811     Updated:  06/10/20 0817    Narrative:       EXAMINATION: FL C ARM DURING SURGERY- 06/09/2020     INDICATION: sergey; S72.011A-Unspecified intracapsular fracture of right  femur, initial encounter for closed fracture; F03.90-Unspecified  dementia without behavioral disturbance; Z91.81-History of falling      TECHNIQUE: Intraoperative fluoroscopy for improved localization and  treatment planning.     COMPARISON: NONE     FINDINGS: Intraoperative fluoroscopy with total fluoroscopic time usage  16 seconds and one representative image saved during right  hemiarthroplasty.       Impression:       Intraoperative fluoroscopy utilized during right  hemiarthroplasty.     D:  06/10/2020  E:  06/10/2020                      I have reviewed the medications:  Scheduled Meds:  acetaminophen 1,000 mg Oral Q8H   aspirin 81 mg Oral Q12H   ceFAZolin 2 g Intravenous Q8H   cefTRIAXone 1 g Intravenous Q24H   docusate sodium 100 mg Oral Daily   [START ON 6/11/2020] famotidine 20 mg Oral Daily   insulin lispro 0-7 Units Subcutaneous TID AC   lisinopril 10 mg Oral Q24H   metoprolol succinate XL 25 mg Oral Daily   sertraline 50 mg Oral Daily   sodium chloride 10 mL Intravenous Q12H     Continuous Infusions:  lactated ringers 100 mL/hr Last Rate: 100 mL/hr (06/10/20 1026)     PRN Meds:.acetaminophen  •  dextrose  •  dextrose  •  glucagon (human recombinant)  •  HYDROmorphone **AND** naloxone  •  melatonin  •  Morphine  •  ondansetron  •  oxyCODONE  •  oxyCODONE  •  polyethylene glycol  •  sodium chloride  •  traMADol    Assessment/Plan   Assessment & Plan     Active Hospital Problems    Diagnosis  POA   • **Closed subcapital fracture of right femur (CMS/[facility]) [S72.011A]  Yes   • Acute UTI (urinary  tract infection) [N39.0]  Yes   • Atrial fibrillation (CMS/HCC) [I48.91]  Yes   • Diabetes mellitus (CMS/HCC) [E11.9]  Yes   • Hyperlipidemia [E78.5]  Yes   • Hypertension [I10]  Yes      Resolved Hospital Problems   No resolved problems to display.        Brief Hospital Course to date:  Yuli Jose is a 89 y.o. female with history of pAF, HTN, T2DM, dementia who presents from rehab with fall and found to have right subcapital femoral fx. Of note, patient recovering from left hip fracture over at [facility] and rehab. Also noted to have UTI. Patient will be admitted to hospitalist service for further w/up. She underwent R anterior hip arthroplasty on 6/9.     Right subcapital femoral fx  - secondary to mechanical fall  - no complaints of pain. Opted to forego nerve block; risk> benefit  - Dr. Vasquez POD 1     UTI  - urine culture negative  - continue IVF for now     Chronic atrial fibrillation:  - stable and rate controlled  - [id number] = 5. Not anticoagulated. Likely due to age, falls risk     Hypertension:  - controlled and stable  - continue metoprolol, lisinopril with hold parameters, bp low this morning     Dementia:  - history of sundowning. Monitor  - antipsychotics as needed-- ekg this morning with QTc long but unchanged     Prolonged QTc  - avoid prolonging agents     DVT Prophylaxis:  ASA per ortho    Daily Care Communication  Due to current limited visitation policies, an attempt will be made daily to update patient's identified best point-of-contact(s)   Contact: cami montano - state guardianship, no answer to both numbers   Relation:    Type of communication (phone or televideo):    Time of communication: 1051   Notes (if applicable):      Disposition: I expect the patient to be discharged tomorrow to rehab    CODE STATUS:   Code Status and Medical Interventions:   Ordered at: 06/09/20 4532     Level Of Support Discussed With:    Patient     Code Status:    CPR     Medical Interventions (Level  of Support Prior to Arrest):    Full         Electronically signed by Kaykay Bernard DO, 06/10/20, 10:41.

## 2020-06-10 NOTE — ANESTHESIA POSTPROCEDURE EVALUATION
Patient: Yuli Jose    Procedure Summary     Date:  06/09/20 Room / Location:   [facility]  [facility]    Anesthesia Start:  1756 Anesthesia Stop:  2021    Procedure:  HIP HEMIARTHROPLASTY ANTERIOR (Right Hip) Diagnosis:       Closed right hip fracture (CMS/HCC)      (Closed right hip fracture (CMS/HCC) [[id number]])    Surgeon:  Obey Vasquez MD Provider:  Humberto Gordon MD    Anesthesia Type:  general with block ASA Status:  3          Anesthesia Type: general with block    Vitals  No vitals data found for the desired time range.          Post Anesthesia Care and Evaluation    Patient location during evaluation: PACU  Patient participation: complete - patient cannot participate  Level of consciousness: agitated  Pain score: 0  Pain management: adequate  Airway patency: patent  Anesthetic complications: No anesthetic complications  PONV Status: none  Cardiovascular status: acceptable  Respiratory status: acceptable  Hydration status: acceptable

## 2020-06-10 NOTE — PLAN OF CARE
Problem: Patient Care Overview  Goal: Plan of Care Review  Flowsheets (Taken 6/10/2020 0909)  Outcome Summary: OT IE completed. Pt is A, Ox1, following simple commands. Max A x2 bed mobility. Mod A x2 STS and step to chair transfer. Dependent for LB bathing/toileting. Max A simple grooming. OT will follow IP. Recommend SNF at d/c for best outcome.

## 2020-06-10 NOTE — PLAN OF CARE
Problem: Fall Risk (Adult)  Goal: Identify Related Risk Factors and Signs and Symptoms  Flowsheets (Taken 6/9/2020 1535 by Xu Orr RN)  Related Risk Factors (Fall Risk): age-related changes;confusion/agitation;history of falls;gait/mobility problems  Signs and Symptoms (Fall Risk): presence of risk factors     Problem: Skin Injury Risk (Adult)  Goal: Identify Related Risk Factors and Signs and Symptoms  Flowsheets (Taken 6/9/2020 1535 by Xu Orr, RN)  Related Risk Factors (Skin Injury Risk): advanced age;cognitive impairment;mobility impaired     Problem: Patient Care Overview  Goal: Plan of Care Review  Flowsheets  Taken 6/9/2020 1535 by Xu Orr RN  Progress: no change  Taken 6/10/2020 0200 by Ramon Knight RN  Plan of Care Reviewed With: patient  Taken 6/10/2020 0424 by Ramon Knight RN  Outcome Summary: Pt returned to floor from surgery this shift,, rested well initially but became very anxious and paniced. Heart rate was irreglar and rapid until anxiety and pain medication helped. Remains very confused, which is her baseline.     Problem: Fracture Orthopaedic (Adult)  Goal: Signs and Symptoms of Listed Potential Problems Will be Absent, Minimized or Managed (Fracture Orthopaedic)  Flowsheets (Taken 6/9/2020 1535 by Xu Orr RN)  Problems Assessed (Orthopaedic Fracture): all  Problems Present (Orthopaedic Fracture): functional deficit/self-care deficit;pain;skin integrity impairment

## 2020-06-10 NOTE — PROGRESS NOTES
Continued Stay Note  [facility]     Patient Name: Yuli Jose  MRN: [id number]  Today's Date: 6/10/2020    Admit Date: 6/8/2020    Discharge Plan     Row Name 06/10/20 1334       Plan    Plan  [facility]    Patient/Family in Agreement with Plan  yes    Plan Comments  Plan is [facility] tomorrow. [facility] is scheduled for Thu. 6/11 at 14:15. PCS on chart. Spoke with Kirti with [facility] and they are good with her coming back tomorrow. CM will continue to follow.    Final Discharge Disposition Code  03 - skilled nursing facility (SNF)        Discharge Codes    No documentation.             Bi Andersen RN
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
## 2020-06-10 NOTE — PROGRESS NOTES
Orthopedic Progress Note      Patient: Yuli Jose  YOB: 1922     Date of Admission: 6/8/2020  3:29 PM Medical Record Number: [id number]     Attending Physician: Kaykay Bernard DO    Status Post:  Procedure(s):  HIP HEMIARTHROPLASTY ANTERIOR RIGHT Post Operative Day Number: 1    Subjective : No new orthopaedic complaints     Pain Relief: some with present medication.     Systemic Complaints: No Complaints  Vitals:    06/10/20 0506 06/10/20 0700 06/10/20 0817 06/10/20 0900   BP: 90/47  113/64    BP Location: Right arm  Right arm    Patient Position: Lying  Lying    Pulse: 92 82  84   Resp: 16  16    Temp: 97.9 °F (36.6 °C)  97.2 °F (36.2 °C)    TempSrc: Oral  Axillary    SpO2: 100%   98%   Weight:       Height:           Physical Exam: 89 y.o. female    General Appearance:       Alert, cooperative, in no acute distress                  Extremities:    Dressing Clean, Dry and Intact             No clinical sign of DVT        Able to do good movements of digits    Pulses:   Pulses palpable and equal bilaterally           Diagnostic Tests:     Results from last 7 days   Lab Units 06/10/20  0417 06/09/20  0500 06/08/20  1632   WBC 10*3/mm3 10.76 6.00 5.27   HEMOGLOBIN g/dL 11.1* 13.0 12.3   HEMATOCRIT % 35.6 41.6 38.8   PLATELETS 10*3/mm3 233 254 242     Results from last 7 days   Lab Units 06/10/20  0417 06/09/20  0632 06/08/20  1632   SODIUM mmol/L 137 135* 140   POTASSIUM mmol/L 5.1 4.7 4.6   CHLORIDE mmol/L 103 102 102   CO2 mmol/L 23.0 20.0* 28.0   BUN mg/dL 22 20 28*   CREATININE mg/dL 0.81 0.52* 0.66   GLUCOSE mg/dL 222* 74 84   CALCIUM mg/dL 8.5 8.7 8.5     Results from last 7 days   Lab Units 06/08/20  1633   INR  1.09   APTT seconds 36.5     No results found for: URICACID  No results found for: CRYSTAL  Microbiology Results (last 10 days)     Procedure Component Value - Date/Time    COVID PRE-OP / PRE-PROCEDURE SCREENING ORDER (NO ISOLATION) - Swab, Nasopharynx [[id number]] Collected:   06/08/20 1841    Lab Status:  Final result Specimen:  Swab from Nasopharynx Updated:  06/08/20 1941    Narrative:       The following orders were created for panel order COVID PRE-OP / PRE-PROCEDURE SCREENING ORDER (NO ISOLATION) - Swab, Nasopharynx.  Procedure                               Abnormality         Status                     ---------                               -----------         ------                     COVID-19,CEPHEID,CLIFFORD IN-...[[id number]]  Normal              Final result                 Please view results for these tests on the individual orders.    COVID-19,CEPHEID,CLIFFORD IN-HOUSE(OR EMERGENT/ADD-ON),NP SWAB IN TRANSPORT MEDIA 3-4 HR TAT - Swab, Nasopharynx [[id number]]  (Normal) Collected:  06/08/20 1841    Lab Status:  Final result Specimen:  Swab from Nasopharynx Updated:  06/08/20 1941     COVID19 Not Detected    Urine Culture - Urine, Urine, Catheter [[id number]]  (Normal) Collected:  06/08/20 1740    Lab Status:  Final result Specimen:  Urine, Catheter Updated:  06/10/20 0233     Urine Culture No growth        Xr Elbow 2 View Right    Result Date: 6/9/2020  1. No acute osseous abnormality. 2. Old posttraumatic and postoperative changes as noted above. Signer Name: Bi Costa MD  Signed: 6/9/2020 4:53 AM  [facility]    Xr Wrist 3+ View Right    Result Date: 6/9/2020  1.  No acute osseous abnormality. 2.  Previous distal ulnar resection. Degenerative wrist arthropathy. 3.  Chronic extensor tendon sheath calcification. Signer Name: Bi Costa MD  Signed: 6/9/2020 4:55 AM  Workstation Name: [email or web address]  [facility]    Xr Foot 3+ View Right    Result Date: 6/9/2020  No acute osseous abnormality. Signer Name: Bi Costa MD  Signed: 6/9/2020 4:56 AM  Workstation [facility]: [facility]    Xr Chest 1 View    Result Date: 6/8/2020  Asymmetric elevation right  hemidiaphragm with trace right pleural effusion however no pneumothorax.  D:  06/08/2020 E:  06/08/2020  This report was finalized on 6/8/2020 7:05 PM by Dr. Chase Knowles.      Fl C Arm During Surgery    Result Date: 6/10/2020  Intraoperative fluoroscopy utilized during right hemiarthroplasty.  D:  06/10/2020 E:  06/10/2020        Xr Hip With Or Without Pelvis 2 - 3 View Right    Result Date: 6/10/2020  Status post right hip replacement with no hardware complication. Postsurgical changes are seen within the soft tissues.  D:  06/10/2020 E:  06/10/2020      Xr Hip With Or Without Pelvis 2 - 3 View Right    Result Date: 6/8/2020  1. Abnormal foreshortening of the right proximal femur with cortical irregularity in the subcapital region of likely nondisplaced subcapital fracture versus femoral head involvement without displacement from the acetabular cup or protrusio. 2. Irregularity and cortical margins of the right inferior pubic ramus age-indeterminate minimally displaced fracture.  D:  06/08/2020 E:  06/08/2020  This report was finalized on 6/8/2020 7:05 PM by Dr. Chase Knowles.          Current Medications:  Scheduled Meds:  acetaminophen 1,000 mg Oral Q8H   aspirin 81 mg Oral Q12H   ceFAZolin 2 g Intravenous Q8H   cefTRIAXone 1 g Intravenous Q24H   docusate sodium 100 mg Oral Daily   [START ON 6/11/2020] famotidine 20 mg Oral Daily   insulin lispro 0-7 Units Subcutaneous TID AC   lisinopril 10 mg Oral Q24H   metoprolol succinate XL 25 mg Oral Daily   sertraline 50 mg Oral Daily   sodium chloride 10 mL Intravenous Q12H     Continuous Infusions:  lactated ringers 100 mL/hr Last Rate: 100 mL/hr (06/09/20 2123)     PRN Meds:.acetaminophen  •  dextrose  •  dextrose  •  glucagon (human recombinant)  •  HYDROmorphone **AND** naloxone  •  melatonin  •  Morphine  •  ondansetron  •  oxyCODONE  •  oxyCODONE  •  polyethylene glycol  •  sodium chloride  •  traMADol    Assessment: Status post  No admission procedures for  hospital encounter.    Patient Active Problem List   Diagnosis   • Allergic rhinitis   • Atrial fibrillation (CMS/HCC)   • Basal cell carcinoma of skin   • Diabetes mellitus (CMS/HCC)   • Degeneration of intervertebral disc of lumbar region   • Hyperlipidemia   • Hypertension   • Lichen sclerosus et atrophicus   • Macular degeneration   • Osteoarthritis   • Osteoporosis   • Vitamin D deficiency   • Closed left hip fracture (CMS/HCC)   • Closed subcapital fracture of right femur (CMS/HCC)   • Acute UTI (urinary tract infection)       PLAN:   Continues current post-op course  Anticoagulation: Aspirin started  Mobilize with PT as tolerated per protocol    Weight Bearing: WBAT  Discharge Plan: OK to plan for discharge in tomorrow to SNF  from orthopadic perspective.    Obey Vasquez MD    Date: 6/10/2020    Time: 10:01

## 2020-06-10 NOTE — PROGRESS NOTES
Continued Stay Note  [facility]     Patient Name: Yuli Jose  MRN: [id number]  Today's Date: 6/10/2020    Admit Date: 6/8/2020    Discharge Plan     Row Name 06/10/20 0950       Plan    Plan  [facility]    Patient/Family in Agreement with Plan  unable to assess    Plan Comments  CM checked on patient and she is sleeping. Plan is back to [facility] at discharge. Patient will need an ambulance transport. CM will continue to follow.     Final Discharge Disposition Code  04 - intermediate care facility        Discharge Codes    No documentation.             Bi Andersen RN

## 2020-06-10 NOTE — PLAN OF CARE
Problem: Patient Care Overview  Goal: Plan of Care Review  Outcome: Ongoing (interventions implemented as appropriate)  Flowsheets (Taken 6/10/2020 1755)  Progress: no change  Plan of Care Reviewed With: patient  Note:   Pt alert to self only, VSS, on tele, RA. Primatfit in place, voiding scant amounts. Turning often, was in chair for a few hours today. Right hip optifoam CDI. Various bruises on body, coccyx red but blanchable, pt on waffle mattress. Plan for rehab tomorrow, [facility] scheduled for 1415.

## 2020-06-11 VITALS
DIASTOLIC BLOOD PRESSURE: 65 MMHG | HEART RATE: 96 BPM | WEIGHT: 100 LBS | HEIGHT: 62 IN | OXYGEN SATURATION: 98 % | SYSTOLIC BLOOD PRESSURE: 121 MMHG | BODY MASS INDEX: 18.4 KG/M2 | RESPIRATION RATE: 16 BRPM | TEMPERATURE: 98 F

## 2020-06-11 PROBLEM — N39.0 ACUTE UTI (URINARY TRACT INFECTION): Status: RESOLVED | Noted: 2020-06-08 | Resolved: 2020-06-11

## 2020-06-11 LAB
ALBUMIN SERPL-MCNC: 2.8 G/DL (ref 3.5–5.2)
ANION GAP SERPL CALCULATED.3IONS-SCNC: 12 MMOL/L (ref 5–15)
BUN BLD-MCNC: 16 MG/DL (ref 8–23)
BUN/CREAT SERPL: 27.6 (ref 7–25)
CALCIUM SPEC-SCNC: 8.7 MG/DL (ref 8.2–9.6)
CHLORIDE SERPL-SCNC: 99 MMOL/L (ref 98–107)
CO2 SERPL-SCNC: 26 MMOL/L (ref 22–29)
CREAT BLD-MCNC: 0.58 MG/DL (ref 0.57–1)
GFR SERPL CREATININE-BSD FRML MDRD: 96 ML/MIN/1.73
GLUCOSE BLD-MCNC: 136 MG/DL (ref 65–99)
GLUCOSE BLDC GLUCOMTR-MCNC: 121 MG/DL (ref 70–130)
GLUCOSE BLDC GLUCOMTR-MCNC: 140 MG/DL (ref 70–130)
HCT VFR BLD AUTO: 35.6 % (ref 34–46.6)
HGB BLD-MCNC: 11.1 G/DL (ref 12–15.9)
PHOSPHATE SERPL-MCNC: 2.5 MG/DL (ref 2.5–4.5)
POTASSIUM BLD-SCNC: 4.9 MMOL/L (ref 3.5–5.2)
SODIUM BLD-SCNC: 137 MMOL/L (ref 136–145)

## 2020-06-11 PROCEDURE — 85018 HEMOGLOBIN: CPT | Performed by: ORTHOPAEDIC SURGERY

## 2020-06-11 PROCEDURE — 85014 HEMATOCRIT: CPT | Performed by: ORTHOPAEDIC SURGERY

## 2020-06-11 PROCEDURE — 82962 GLUCOSE BLOOD TEST: CPT

## 2020-06-11 PROCEDURE — 80069 RENAL FUNCTION PANEL: CPT | Performed by: INTERNAL MEDICINE

## 2020-06-11 PROCEDURE — 99239 HOSP IP/OBS DSCHRG MGMT >30: CPT | Performed by: INTERNAL MEDICINE

## 2020-06-11 RX ORDER — TRAMADOL HYDROCHLORIDE 50 MG/1
50 TABLET ORAL EVERY 12 HOURS PRN
Start: 2020-06-11 | End: 2020-06-19

## 2020-06-11 RX ADMIN — DOCUSATE SODIUM 100 MG: 100 CAPSULE, LIQUID FILLED ORAL at 08:39

## 2020-06-11 RX ADMIN — LISINOPRIL 10 MG: 10 TABLET ORAL at 08:38

## 2020-06-11 RX ADMIN — SODIUM CHLORIDE, POTASSIUM CHLORIDE, SODIUM LACTATE AND CALCIUM CHLORIDE 100 ML/HR: 600; 310; 30; 20 INJECTION, SOLUTION INTRAVENOUS at 08:41

## 2020-06-11 RX ADMIN — MAGNESIUM HYDROXIDE 4 ML: 2400 SUSPENSION ORAL at 13:36

## 2020-06-11 RX ADMIN — TRAMADOL HYDROCHLORIDE 50 MG: 50 TABLET, FILM COATED ORAL at 00:35

## 2020-06-11 RX ADMIN — FAMOTIDINE 20 MG: 20 TABLET, FILM COATED ORAL at 08:39

## 2020-06-11 RX ADMIN — POLYETHYLENE GLYCOL 3350 17 G: 17 POWDER, FOR SOLUTION ORAL at 08:38

## 2020-06-11 RX ADMIN — OXYCODONE 5 MG: 5 TABLET ORAL at 04:58

## 2020-06-11 RX ADMIN — SERTRALINE HYDROCHLORIDE 50 MG: 50 TABLET ORAL at 08:39

## 2020-06-11 RX ADMIN — ACETAMINOPHEN 1000 MG: 500 TABLET, FILM COATED ORAL at 04:58

## 2020-06-11 RX ADMIN — METOPROLOL SUCCINATE 25 MG: 25 TABLET, EXTENDED RELEASE ORAL at 08:39

## 2020-06-11 RX ADMIN — ASPIRIN 81 MG: 81 TABLET, COATED ORAL at 08:39

## 2020-06-11 RX ADMIN — TRAMADOL HYDROCHLORIDE 50 MG: 50 TABLET, FILM COATED ORAL at 08:38

## 2020-06-11 NOTE — DISCHARGE PLACEMENT REQUEST
"Jennifer Ba (89 y.o. Female)     Date of Birth Social Security Number Address Home Phone MRN    08/09/1922  [facility]  [address]  [facility] [address] [phone] [id number]    [place] Marital Status          Anglican Single       Admission Date Admission Type Admitting Provider Attending Provider Department, Room/Bed    6/8/20 Emergency Kaykay Bernard DO Carroll, Meghan C, DO [facility] 3G, S351/1    Discharge Date Discharge Disposition Discharge Destination         Rehab Facility or Unit (DC - External)              Attending Provider:  Kaykay Bernard DO    Allergies:  No Known Allergies    Isolation:  None   Infection:  None   Code Status:  No CPR    Ht:  157.5 cm (62\")   Wt:  45.4 kg (100 lb)    Admission Cmt:  None   Principal Problem:  Closed subcapital fracture of right femur (CMS/[facility]) [S72.011A]                 Active Insurance as of 6/8/2020     Primary Coverage     Payor Plan Insurance Group Employer/Plan Group    MEDICARE MEDICARE A & B      Payor Plan Address Payor Plan Phone Number Payor Plan Fax Number Effective Dates    [address] [phone]  6/1/1977 - None Entered    formerly [facility] [address]       Subscriber Name Subscriber Birth Date Member ID       JENNIFER BA 8/9/1922 [id number]           Secondary Coverage     Payor Plan Insurance Group Employer/Plan Group    [place]      Payor Plan Address Payor Plan Phone Number Payor Plan Fax Number Effective Dates    [address] [phone]  6/8/2020 - None Entered    [place] [address]       Subscriber Name Subscriber Birth Date Member ID       JENNIFER BA 8/9/1922 [id number]                 Emergency Contacts      (Rel.) Home Phone Work Phone Mobile Phone    Melody Cuellar (Guardian) -- [phone]               Discharge Summary      Kaykay Bernard DO at 06/11/20 0938              [facility] Medicine " Services  DISCHARGE SUMMARY    Patient Name: Yuli Jose  : 1922  MRN: [id number]    Date of Admission: 2020  3:29 PM  Date of Discharge:  2020  Primary Care Physician: Jennie Domínguez MD    Consults     Date and Time Order Name Status Description    20202 Inpatient Orthopedic Surgery Consult Completed           Hospital Course     Presenting Problem:   Closed subcapital fracture of right femur, initial encounter (CMS/[facility]) [S72.011A]    Active Hospital Problems    Diagnosis  POA   • **Closed subcapital fracture of right femur (CMS/[facility]) [S72.011A]  Yes   • Atrial fibrillation (CMS/[facility]) [I48.91]  Yes   • Diabetes mellitus (CMS/[facility]) [E11.9]  Yes   • Hyperlipidemia [E78.5]  Yes   • Hypertension [I10]  Yes      Resolved Hospital Problems    Diagnosis Date Resolved POA   • Acute UTI (urinary tract infection) [N39.0] 2020 Yes          Hospital Course:  Yuli Jose is a 89 y.o. female with history of pAF, HTN, T2DM, dementia who presents from rehab with fall and found to have right subcapital femoral fx. Of note, patient recovering from left hip fracture over at [facility] and rehab. Also noted to have UTI. Patient admitted to hospitalist service for further w/up. She underwent R anterior hip arthroplasty on .     Right subcapital femoral fx  - secondary to mechanical fall  - no complaints of pain. Opted to forego nerve block; risk> benefit  - Dr. Vasquez POD  2     UTI  - urine culture negative, s/p 3 days of IV rocephin       Chronic atrial fibrillation:  - stable and rate controlled  - [id number] = 5. Not anticoagulated. Likely due to age, falls risk  - continue ASA per ortho     Hypertension:  - controlled and stable  - continue metoprolol, and restart benzapril at [place]     Dementia:  - history of sundowning. Monitor  - antipsychotics as needed-- ekg  6/10 with QTc long but unchanged     Prolonged QTc  - avoid prolonging agents    Discharge Follow Up Recommendations for outpatient  labs/diagnostics:  Follow up with PCP in 1 week  No recs regarding followup per ortho notes    Day of Discharge     HPI:   Eating breakfast, no complaints, confused at baseline    Review of Systems  Follows some commands, not able to obtain secondary to baseline dementia    Vital Signs:   Temp:  [97.3 °F (36.3 °C)-98.1 °F (36.7 °C)] 97.8 °F (36.6 °C)  Heart Rate:  [69-96] 96  Resp:  [16-18] 16  BP: (101-144)/(55-85) 120/81     Physical Exam:  Constitutional: No acute distress, awake, alert  HENT: NCAT, mucous membranes moist  Respiratory: Clear to auscultation bilaterally, respiratory effort normal   Cardiovascular: RRR, no murmurs, rubs, or gallops, palpable pedal pulses bilaterally  Gastrointestinal: Positive bowel sounds, soft, nontender, nondistended  Musculoskeletal: No bilateral ankle edema  Psychiatric: Appropriate affect, cooperative  Neurologic: Oriented to self, follows some commands, speech clear  Skin: No rashes    Pertinent  and/or Most Recent Results     Results from last 7 days   Lab Units 06/11/20  0513 06/10/20  0417 06/09/20  0632 06/09/20  0500 06/08/20  1632   WBC 10*3/mm3  --  10.76  --  6.00 5.27   HEMOGLOBIN g/dL 11.1* 11.1*  --  13.0 12.3   HEMATOCRIT % 35.6 35.6  --  41.6 38.8   PLATELETS 10*3/mm3  --  233  --  254 242   SODIUM mmol/L 137 137 135*  --  140   POTASSIUM mmol/L 4.9 5.1 4.7  --  4.6   CHLORIDE mmol/L 99 103 102  --  102   CO2 mmol/L 26.0 23.0 20.0*  --  28.0   BUN mg/dL 16 22 20  --  28*   CREATININE mg/dL 0.58 0.81 0.52*  --  0.66   GLUCOSE mg/dL 136* 222* 74  --  84   CALCIUM mg/dL 8.7 8.5 8.7  --  8.5     Results from last 7 days   Lab Units 06/08/20  1633 06/08/20  1632   BILIRUBIN mg/dL  --  0.4   ALK PHOS U/L  --  140*   ALT (SGPT) U/L  --  5   AST (SGOT) U/L  --  16   PROTIME Seconds 13.8  --    INR  1.09  --    APTT seconds 36.5  --            Invalid input(s): TG, LDLCALC, LDLREALC  Results from last 7 days   Lab Units 06/08/20  1632   PROBNP pg/mL 2,499.0*   TROPONIN T  ng/mL <0.010       Brief Urine Lab Results  (Last result in the past 365 days)      Color   Clarity   Blood   Leuk Est   Nitrite   Protein   CREAT   Urine HCG        06/08/20 1740 Orange Clear Negative Small (1+) Positive Negative               Microbiology Results Abnormal     Procedure Component Value - Date/Time    Urine Culture - Urine, Urine, Catheter [[id number]]  (Normal) Collected:  06/08/20 1740    Lab Status:  Final result Specimen:  Urine, Catheter Updated:  06/10/20 0233     Urine Culture No growth    COVID PRE-OP / PRE-PROCEDURE SCREENING ORDER (NO ISOLATION) - Swab, Nasopharynx [[id number]] Collected:  06/08/20 1841    Lab Status:  Final result Specimen:  Swab from Nasopharynx Updated:  06/08/20 1941    Narrative:       The following orders were created for panel order COVID PRE-OP / PRE-PROCEDURE SCREENING ORDER (NO ISOLATION) - Swab, Nasopharynx.  Procedure                               Abnormality         Status                     ---------                               -----------         ------                     COVID-19,CEPHEID,CLIFFORD IN-...[[id number]]  Normal              Final result                 Please view results for these tests on the individual orders.    COVID-19,CEPHEID,CLIFFORD IN-HOUSE(OR EMERGENT/ADD-ON),NP SWAB IN TRANSPORT MEDIA 3-4 HR TAT - Swab, Nasopharynx [[id number]]  (Normal) Collected:  06/08/20 1841    Lab Status:  Final result Specimen:  Swab from Nasopharynx Updated:  06/08/20 1941     COVID19 Not Detected          Imaging Results (All)     Procedure Component Value Units Date/Time    FL C Arm During Surgery [[id number]] Collected:  06/10/20 0811     Updated:  06/10/20 1718    Narrative:       EXAMINATION: FL C ARM DURING SURGERY- 06/09/2020     INDICATION: sergey; S72.011A-Unspecified intracapsular fracture of right  femur, initial encounter for closed fracture; F03.90-Unspecified  dementia without behavioral disturbance; Z91.81-History of falling      TECHNIQUE: Intraoperative  fluoroscopy for improved localization and  treatment planning.     COMPARISON: NONE     FINDINGS: Intraoperative fluoroscopy with total fluoroscopic time usage  16 seconds and one representative image saved during right  hemiarthroplasty.       Impression:       Intraoperative fluoroscopy utilized during right  hemiarthroplasty.     D:  06/10/2020  E:  06/10/2020         This report was finalized on 6/10/2020 5:15 PM by Dr. Chase Knowles.       XR Hip With or Without Pelvis 2 - 3 View Right [[id number]] Collected:  06/10/20 0830     Updated:  06/10/20 1658    Narrative:       EXAMINATION: XR HIP W OR WO PELVIS 2-3 VIEW RIGHT-      INDICATION: Postop hip arthroplasty; S72.011A-Unspecified intracapsular  fracture of right femur, initial encounter for closed fracture;  F03.90-Unspecified dementia without behavioral disturbance;  Z91.81-History of falling.      COMPARISON: None.     FINDINGS: Imaging of the pelvis and two views of the right hip reveal  patient to be status post replacement of the right hip with postsurgical  changes seen in the soft tissues. No hardware complication identified.  Satisfactory alignment of the prosthesis. Osteopenia of the bony  structures.           Impression:       Status post right hip replacement with no hardware  complication. Postsurgical changes are seen within the soft tissues.     D:  06/10/2020  E:  06/10/2020     This report was finalized on 6/10/2020 4:55 PM by Dr. Kari Bocanegra MD.       XR Foot 3+ View Right [[id number]] Collected:  06/09/20 0456     Updated:  06/09/20 0458    Narrative:       RIGHT FOOT, 6/9/2020      HISTORY:    89-year-old female in the ED with pain after multiple falls.      TECHNIQUE:  Three-view right foot series.      FINDINGS:  No fracture, dislocation or other acute osseous abnormality is demonstrated. Demineralization suggesting osteoporosis. Probable hammertoe deformities.      Impression:       No acute osseous abnormality.    Signer Name:  Bi Costa MD   Signed: 6/9/2020 4:56 AM   Workstation Name: [facility]    [facility]    XR Wrist 3+ View Right [[id number]] Collected:  06/09/20 0455     Updated:  06/09/20 0457    Narrative:       RIGHT WRIST, 6/9/2020      HISTORY:    89-year-old female in the ED with pain after multiple falls.      TECHNIQUE:  Three-view right wrist series.      FINDINGS:  No fracture, dislocation or other acute osseous abnormality is demonstrated.  Previous resection of the distal ulna. Chronic degenerative arthropathy predominantly involving the radiocarpal articulation. There is chronic tendon sheath calcification along the medial extensor tendons. Demineralization.      Impression:       1.  No acute osseous abnormality.  2.  Previous distal ulnar resection. Degenerative wrist arthropathy.  3.  Chronic extensor tendon sheath calcification.    Signer Name: Bi Costa MD   Signed: 6/9/2020 4:55 AM   Workstation Name: [facility]    [facility]    XR Elbow 2 View Right [[id number]] Collected:  06/09/20 0453     Updated:  06/09/20 0456    Narrative:       RIGHT ELBOW, 6/9/2020      HISTORY:    89-year-old female in the ED with pain after multiple falls.      TECHNIQUE:  Limited two view right elbow series.      FINDINGS:  No acute fracture or dislocation is demonstrated.    Extensive old healed fracture deformity of the distal humerus status post prior plate and screw fixation. There is also a longitudinal screw and adjacent cortical wire traversing the olecranon process of the ulna. Secondary degenerative arthropathy  involving the elbow joint.      Impression:       1. No acute osseous abnormality.  2. Old posttraumatic and postoperative changes as noted above.    Signer Name: Bi Costa MD   Signed: 6/9/2020 4:53 AM   [facility]    [facility]    XR Chest 1 View [[id number]] Collected:  06/08/20 1635      Updated:  06/08/20 1908    Narrative:       EXAMINATION: XR CHEST 1 VW- 06/08/2020     INDICATION: FALL      COMPARISON: NONE     FINDINGS: Cardiac size enlarged. Pulmonary vascularity in normal limits  and without overt edema. No focal opacification or consolidation with  right hemidiaphragm asymmetrically elevated. Probable trace right  pleural effusion. Degenerative changes of the spine. Loop recorder  overlies the left chest.           Impression:       Asymmetric elevation right hemidiaphragm with trace right  pleural effusion however no pneumothorax.     D:  06/08/2020  E:  06/08/2020     This report was finalized on 6/8/2020 7:05 PM by Dr. Chase Knowles.       XR Hip With or Without Pelvis 2 - 3 View Right [[id number]] Collected:  06/08/20 1635     Updated:  06/08/20 1908    Narrative:       EXAMINATION: XR HIP W OR WO PELVIS 2-3 VIEW RIGHT- 06/08/2020     INDICATION: TRAUMA      COMPARISON: NONE     FINDINGS: Single AP view of the pelvis along with AP and lateral views  of the right hip reveal an SI joint symmetric and without widening. No  displaced pelvic ring fracture however irregularity in the right  inferior pubic ramus somewhat well corticated may represent prior healed  injury although age indeterminate. Right hip is well located in regards  to the right femoral head however abnormal cortical irregularity in the  subcapital region with a somewhat foreshortened appearance may represent  compacted nondisplaced subcapital fracture or involvement of the femoral  head in regards to fracture line seen best on lateral view of the hip  without protrusio. Left hip hardware remains in place.       Impression:       1. Abnormal foreshortening of the right proximal femur with cortical  irregularity in the subcapital region of likely nondisplaced subcapital  fracture versus femoral head involvement without displacement from the  acetabular cup or protrusio.  2. Irregularity and cortical margins of the right  inferior pubic ramus  age-indeterminate minimally displaced fracture.     D:  06/08/2020  E:  06/08/2020     This report was finalized on 6/8/2020 7:05 PM by Dr. Chase Knowles.                            Plan for Follow-up of Pending Labs/Results: no pending labs, recommend repeat labs with PCP followup in 1 week    Discharge Details        Discharge Medications      Changes to Medications      Instructions Start Date   traMADol 50 MG tablet  Commonly known as:  ULTRAM  What changed:    · when to take this  · reasons to take this   50 mg, Oral, Every 12 Hours PRN         Continue These Medications      Instructions Start Date   acetaminophen 325 MG tablet  Commonly known as:  TYLENOL   650 mg, Oral, Every 6 Hours PRN      aspirin 81 MG EC tablet   81 mg, Oral, Daily      benazepril 10 MG tablet  Commonly known as:  LOTENSIN   10 mg, Oral, Daily      diclofenac 1 % gel gel  Commonly known as:  VOLTAREN   2 g, Topical, 4 Times Daily PRN      docusate sodium 100 MG capsule  Commonly known as:  COLACE   100 mg, Oral, Daily      insulin glargine 100 UNIT/ML injection  Commonly known as:  LANTUS   10 Units, Subcutaneous, Daily      magnesium oxide 250 MG tablet   1,000 mg, Oral, Daily, TAKES 1000mg tablet      metoprolol succinate XL 25 MG 24 hr tablet  Commonly known as:  TOPROL-XL   25 mg, Oral, Daily, Hold if Systolic BP less than 110       polyethylene glycol pack packet  Commonly known as:  MIRALAX   17 g, Oral, Daily PRN      sertraline 50 MG tablet  Commonly known as:  ZOLOFT   50 mg, Oral, Daily         Stop These Medications    amoxicillin-clavulanate 875-125 MG per tablet  Commonly known as:  AUGMENTIN     Calcium Carb-Cholecalciferol 600-400 MG-UNIT tablet     phenazopyridine 100 MG tablet  Commonly known as:  PYRIDIUM            No Known Allergies      Discharge Disposition:  Rehab Facility or Unit (DC - External)    Diet:  Hospital:  Diet Order   Procedures   • Diet Regular       Activity:  with  assistance    Restrictions or Other Recommendations:         CODE STATUS:    Code Status and Medical Interventions:   Ordered at: 06/10/20 1137     Limited Support to NOT Include:    Intubation     Level Of Support Discussed With:    Health Care Surrogate     Code Status:    No CPR     Medical Interventions (Level of Support Prior to Arrest):    Limited       No future appointments.        Time Spent on Discharge:  39 minutes    Electronically signed by Kaykay Bernard DO, 06/11/20, 9:38 AM.      Electronically signed by Kaykay Bernard DO at 06/11/20 0944

## 2020-06-11 NOTE — PROGRESS NOTES
Case Management Discharge Note      Final Note: Plan is [facility]. [facility] is scheduled for 14:15. Nurse to call report to [phone] and fax discharge summary to [phone]. PCS and EMS DNR are on the chart. Please send any hard scripts for medications with the patient in transfer packet.         Destination - Selection Complete      Service Provider Request Status Selected Services Address Phone Number Fax Number    [facility] Selected Intermediate Care [address] [place] [address] [phone]      Durable Medical Equipment      No service has been selected for the patient.      Dialysis/Infusion      No service has been selected for the patient.      Home Medical Care      No service has been selected for the patient.      Therapy      No service has been selected for the patient.      Community Resources      No service has been selected for the patient.             Final Discharge Disposition Code: 03 - skilled nursing facility (SNF)

## 2020-06-11 NOTE — DISCHARGE SUMMARY
[facility] Medicine Services  DISCHARGE SUMMARY    Patient Name: Yuli Jose  : 1922  MRN: [id number]    Date of Admission: 2020  3:29 PM  Date of Discharge:  2020  Primary Care Physician: Jennie Domínguez MD    Consults     Date and Time Order Name Status Description    2020 2202 Inpatient Orthopedic Surgery Consult Completed           Hospital Course     Presenting Problem:   Closed subcapital fracture of right femur, initial encounter (CMS/[facility]) [S72.011A]    Active Hospital Problems    Diagnosis  POA   • **Closed subcapital fracture of right femur (CMS/[facility]) [S72.011A]  Yes   • Atrial fibrillation (CMS/[facility]) [I48.91]  Yes   • Diabetes mellitus (CMS/[facility]) [E11.9]  Yes   • Hyperlipidemia [E78.5]  Yes   • Hypertension [I10]  Yes      Resolved Hospital Problems    Diagnosis Date Resolved POA   • Acute UTI (urinary tract infection) [N39.0] 2020 Yes          Hospital Course:  Yuli Jose is a 89 y.o. female with history of pAF, HTN, T2DM, dementia who presents from rehab with fall and found to have right subcapital femoral fx. Of note, patient recovering from left hip fracture over at [facility] and rehab. Also noted to have UTI. Patient admitted to hospitalist service for further w/up. She underwent R anterior hip arthroplasty on .     Right subcapital femoral fx  - secondary to mechanical fall  - no complaints of pain. Opted to forego nerve block; risk> benefit  - Dr. Vasquez POD 2     UTI  - urine culture negative, s/p 3 days of IV rocephin       Chronic atrial fibrillation:  - stable and rate controlled  - [id number] = 5. Not anticoagulated. Likely due to age, falls risk  - continue ASA per ortho     Hypertension:  - controlled and stable  - continue metoprolol, and restart benzapril at dc     Dementia:  - history of sundowning. Monitor  - antipsychotics as needed-- ekg 6/10 with QTc long but unchanged     Prolonged QTc  - avoid prolonging  agents    Discharge Follow Up Recommendations for outpatient labs/diagnostics:  Follow up with PCP in 1 week  No recs regarding followup per ortho notes    Day of Discharge     HPI:   Eating breakfast, no complaints, confused at baseline    Review of Systems  Follows some commands, not able to obtain secondary to baseline dementia    Vital Signs:   Temp:  [97.3 °F (36.3 °C)-98.1 °F (36.7 °C)] 97.8 °F (36.6 °C)  Heart Rate:  [69-96] 96  Resp:  [16-18] 16  BP: (101-144)/(55-85) 120/81     Physical Exam:  Constitutional: No acute distress, awake, alert  HENT: NCAT, mucous membranes moist  Respiratory: Clear to auscultation bilaterally, respiratory effort normal   Cardiovascular: RRR, no murmurs, rubs, or gallops, palpable pedal pulses bilaterally  Gastrointestinal: Positive bowel sounds, soft, nontender, nondistended  Musculoskeletal: No bilateral ankle edema  Psychiatric: Appropriate affect, cooperative  Neurologic: Oriented to self, follows some commands, speech clear  Skin: No rashes    Pertinent  and/or Most Recent Results     Results from last 7 days   Lab Units 06/11/20  0513 06/10/20  0417 06/09/20  0632 06/09/20  0500 06/08/20  1632   WBC 10*3/mm3  --  10.76  --  6.00 5.27   HEMOGLOBIN g/dL 11.1* 11.1*  --  13.0 12.3   HEMATOCRIT % 35.6 35.6  --  41.6 38.8   PLATELETS 10*3/mm3  --  233  --  254 242   SODIUM mmol/L 137 137 135*  --  140   POTASSIUM mmol/L 4.9 5.1 4.7  --  4.6   CHLORIDE mmol/L 99 103 102  --  102   CO2 mmol/L 26.0 23.0 20.0*  --  28.0   BUN mg/dL 16 22 20  --  28*   CREATININE mg/dL 0.58 0.81 0.52*  --  0.66   GLUCOSE mg/dL 136* 222* 74  --  84   CALCIUM mg/dL 8.7 8.5 8.7  --  8.5     Results from last 7 days   Lab Units 06/08/20  1633 06/08/20  1632   BILIRUBIN mg/dL  --  0.4   ALK PHOS U/L  --  140*   ALT (SGPT) U/L  --  5   AST (SGOT) U/L  --  16   PROTIME Seconds 13.8  --    INR  1.09  --    APTT seconds 36.5  --            Invalid input(s): TG, LDLCALC, LDLREALC  Results from last 7 days    Lab Units 06/08/20  1632   PROBNP pg/mL 2,499.0*   TROPONIN T ng/mL <0.010       Brief Urine Lab Results  (Last result in the past 365 days)      Color   Clarity   Blood   Leuk Est   Nitrite   Protein   CREAT   Urine HCG        06/08/20 1740 Orange Clear Negative Small (1+) Positive Negative               Microbiology Results Abnormal     Procedure Component Value - Date/Time    Urine Culture - Urine, Urine, Catheter [[id number]]  (Normal) Collected:  06/08/20 1740    Lab Status:  Final result Specimen:  Urine, Catheter Updated:  06/10/20 0233     Urine Culture No growth    COVID PRE-OP / PRE-PROCEDURE SCREENING ORDER (NO ISOLATION) - Swab, Nasopharynx [[id number]] Collected:  06/08/20 1841    Lab Status:  Final result Specimen:  Swab from Nasopharynx Updated:  06/08/20 1941    Narrative:       The following orders were created for panel order COVID PRE-OP / PRE-PROCEDURE SCREENING ORDER (NO ISOLATION) - Swab, Nasopharynx.  Procedure                               Abnormality         Status                     ---------                               -----------         ------                     COVID-19,CEPHEID,CLIFFORD IN-...[[id number]]  Normal              Final result                 Please view results for these tests on the individual orders.    COVID-19,CEPHEID,CLIFFORD IN-HOUSE(OR EMERGENT/ADD-ON),NP SWAB IN TRANSPORT MEDIA 3-4 HR TAT - Swab, Nasopharynx [[id number]]  (Normal) Collected:  06/08/20 1841    Lab Status:  Final result Specimen:  Swab from Nasopharynx Updated:  06/08/20 1941     COVID19 Not Detected          Imaging Results (All)     Procedure Component Value Units Date/Time    FL C Arm During Surgery [[id number]] Collected:  06/10/20 0811     Updated:  06/10/20 1718    Narrative:       EXAMINATION: FL C ARM DURING SURGERY- 06/09/2020     INDICATION: sergey; S72.011A-Unspecified intracapsular fracture of right  femur, initial encounter for closed fracture; F03.90-Unspecified  dementia without behavioral  disturbance; Z91.81-History of falling      TECHNIQUE: Intraoperative fluoroscopy for improved localization and  treatment planning.     COMPARISON: NONE     FINDINGS: Intraoperative fluoroscopy with total fluoroscopic time usage  16 seconds and one representative image saved during right  hemiarthroplasty.       Impression:       Intraoperative fluoroscopy utilized during right  hemiarthroplasty.     D:  06/10/2020  E:  06/10/2020         This report was finalized on 6/10/2020 5:15 PM by Dr. Chase Knowles.       XR Hip With or Without Pelvis 2 - 3 View Right [[id number]] Collected:  06/10/20 0830     Updated:  06/10/20 1658    Narrative:       EXAMINATION: XR HIP W OR WO PELVIS 2-3 VIEW RIGHT-      INDICATION: Postop hip arthroplasty; S72.011A-Unspecified intracapsular  fracture of right femur, initial encounter for closed fracture;  F03.90-Unspecified dementia without behavioral disturbance;  Z91.81-History of falling.      COMPARISON: None.     FINDINGS: Imaging of the pelvis and two views of the right hip reveal  patient to be status post replacement of the right hip with postsurgical  changes seen in the soft tissues. No hardware complication identified.  Satisfactory alignment of the prosthesis. Osteopenia of the bony  structures.           Impression:       Status post right hip replacement with no hardware  complication. Postsurgical changes are seen within the soft tissues.     D:  06/10/2020  E:  06/10/2020     This report was finalized on 6/10/2020 4:55 PM by Dr. Kari Bocanegra MD.       XR Foot 3+ View Right [[id number]] Collected:  06/09/20 0456     Updated:  06/09/20 0458    Narrative:       RIGHT FOOT, 6/9/2020      HISTORY:    89-year-old female in the ED with pain after multiple falls.      TECHNIQUE:  Three-view right foot series.      FINDINGS:  No fracture, dislocation or other acute osseous abnormality is demonstrated. Demineralization suggesting osteoporosis. Probable hammertoe deformities.       Impression:       No acute osseous abnormality.    Signer Name: Bi Costa MD   Signed: 6/9/2020 4:56 AM   Workstation Name: [facility]    [facility]    XR Wrist 3+ View Right [[id number]] Collected:  06/09/20 0455     Updated:  06/09/20 0457    Narrative:       RIGHT WRIST, 6/9/2020      HISTORY:    89-year-old female in the ED with pain after multiple falls.      TECHNIQUE:  Three-view right wrist series.      FINDINGS:  No fracture, dislocation or other acute osseous abnormality is demonstrated.  Previous resection of the distal ulna. Chronic degenerative arthropathy predominantly involving the radiocarpal articulation. There is chronic tendon sheath calcification along the medial extensor tendons. Demineralization.      Impression:       1.  No acute osseous abnormality.  2.  Previous distal ulnar resection. Degenerative wrist arthropathy.  3.  Chronic extensor tendon sheath calcification.    Signer Name: Bi Costa MD   Signed: 6/9/2020 4:55 AM   Workstation Name: [facility]    [facility]    XR Elbow 2 View Right [[id number]] Collected:  06/09/20 0453     Updated:  06/09/20 0456    Narrative:       RIGHT ELBOW, 6/9/2020      HISTORY:    89-year-old female in the ED with pain after multiple falls.      TECHNIQUE:  Limited two view right elbow series.      FINDINGS:  No acute fracture or dislocation is demonstrated.    Extensive old healed fracture deformity of the distal humerus status post prior plate and screw fixation. There is also a longitudinal screw and adjacent cortical wire traversing the olecranon process of the ulna. Secondary degenerative arthropathy  involving the elbow joint.      Impression:       1. No acute osseous abnormality.  2. Old posttraumatic and postoperative changes as noted above.    Signer Name: Bi Costa MD   Signed: 6/9/2020 4:53 AM   Workstation Name: [facility]    [facility]   [place]    XR Chest 1 View [[id number]] Collected:  06/08/20 1637     Updated:  06/08/20 1908    Narrative:       EXAMINATION: XR CHEST 1 VW- 06/08/2020     INDICATION: FALL      COMPARISON: NONE     FINDINGS: Cardiac size enlarged. Pulmonary vascularity in normal limits  and without overt edema. No focal opacification or consolidation with  right hemidiaphragm asymmetrically elevated. Probable trace right  pleural effusion. Degenerative changes of the spine. Loop recorder  overlies the left chest.           Impression:       Asymmetric elevation right hemidiaphragm with trace right  pleural effusion however no pneumothorax.     D:  06/08/2020  E:  06/08/2020     This report was finalized on 6/8/2020 7:05 PM by Dr. Chase Knowles.       XR Hip With or Without Pelvis 2 - 3 View Right [[id number]] Collected:  06/08/20 1635     Updated:  06/08/20 1908    Narrative:       EXAMINATION: XR HIP W OR WO PELVIS 2-3 VIEW RIGHT- 06/08/2020     INDICATION: TRAUMA      COMPARISON: NONE     FINDINGS: Single AP view of the pelvis along with AP and lateral views  of the right hip reveal an SI joint symmetric and without widening. No  displaced pelvic ring fracture however irregularity in the right  inferior pubic ramus somewhat well corticated may represent prior healed  injury although age indeterminate. Right hip is well located in regards  to the right femoral head however abnormal cortical irregularity in the  subcapital region with a somewhat foreshortened appearance may represent  compacted nondisplaced subcapital fracture or involvement of the femoral  head in regards to fracture line seen best on lateral view of the hip  without protrusio. Left hip hardware remains in place.       Impression:       1. Abnormal foreshortening of the right proximal femur with cortical  irregularity in the subcapital region of likely nondisplaced subcapital  fracture versus femoral head involvement without displacement from the  acetabular cup  or protrusio.  2. Irregularity and cortical margins of the right inferior pubic ramus  age-indeterminate minimally displaced fracture.     D:  06/08/2020  E:  06/08/2020     This report was finalized on 6/8/2020 7:05 PM by Dr. Chase Knowles.                            Plan for Follow-up of Pending Labs/Results: no pending labs, recommend repeat labs with PCP followup in 1 week    Discharge Details        Discharge Medications      Changes to Medications      Instructions Start Date   traMADol 50 MG tablet  Commonly known as:  ULTRAM  What changed:    · when to take this  · reasons to take this   50 mg, Oral, Every 12 Hours PRN         Continue These Medications      Instructions Start Date   acetaminophen 325 MG tablet  Commonly known as:  TYLENOL   650 mg, Oral, Every 6 Hours PRN      aspirin 81 MG EC tablet   81 mg, Oral, Daily      benazepril 10 MG tablet  Commonly known as:  LOTENSIN   10 mg, Oral, Daily      diclofenac 1 % gel gel  Commonly known as:  VOLTAREN   2 g, Topical, 4 Times Daily PRN      docusate sodium 100 MG capsule  Commonly known as:  COLACE   100 mg, Oral, Daily      insulin glargine 100 UNIT/ML injection  Commonly known as:  LANTUS   10 Units, Subcutaneous, Daily      magnesium oxide 250 MG tablet   1,000 mg, Oral, Daily, TAKES 1000mg tablet      metoprolol succinate XL 25 MG 24 hr tablet  Commonly known as:  TOPROL-XL   25 mg, Oral, Daily, Hold if Systolic BP less than 110       polyethylene glycol pack packet  Commonly known as:  MIRALAX   17 g, Oral, Daily PRN      sertraline 50 MG tablet  Commonly known as:  ZOLOFT   50 mg, Oral, Daily         Stop These Medications    amoxicillin-clavulanate 875-125 MG per tablet  Commonly known as:  AUGMENTIN     Calcium Carb-Cholecalciferol 600-400 MG-UNIT tablet     phenazopyridine 100 MG tablet  Commonly known as:  PYRIDIUM            No Known Allergies      Discharge Disposition:  Rehab Facility or Unit (DC - External)    Diet:  Hospital:  Diet Order    Procedures   • Diet Regular       Activity:  with assistance    Restrictions or Other Recommendations:         CODE STATUS:    Code Status and Medical Interventions:   Ordered at: 06/10/20 1137     Limited Support to NOT Include:    Intubation     Level Of Support Discussed With:    Health Care Surrogate     Code Status:    No CPR     Medical Interventions (Level of Support Prior to Arrest):    Limited       No future appointments.        Time Spent on Discharge:  39 minutes    Electronically signed by Kaykay Bernard DO, 06/11/20, 9:38 AM.

## 2020-06-11 NOTE — PLAN OF CARE
Problem: Patient Care Overview  Goal: Plan of Care Review  Outcome: Ongoing (interventions implemented as appropriate)  Flowsheets  Taken 6/10/2020 2000  Plan of Care Reviewed With: patient  Taken 6/11/2020 0506  Outcome Summary: Pt VSS. Did not sleep much through the night. Turned q2. Complaints of pain through the night, given PRN po pain medication crushed in applesauce. Pt remains confused. [facility] scheduled for 1415 today. Will continue to monitor.

## 2020-06-11 NOTE — PROGRESS NOTES
Orthopedic Progress Note      Patient: Yuli Jose  YOB: 1922     Date of Admission: 6/8/2020  3:29 PM Medical Record Number: [id number]     Attending Physician: Kaykay Bernard DO    Status Post:  Procedure(s):  HIP HEMIARTHROPLASTY ANTERIOR RIGHT Post Operative Day Number: 2    Subjective : No new orthopaedic complaints     Pain Relief: some relief with present medication.     Systemic Complaints: No Complaints  Vitals:    06/10/20 1925 06/10/20 2353 06/11/20 0418 06/11/20 0758   BP: 113/57 144/55 123/85 120/81   BP Location: Right arm Right arm Right arm Right arm   Patient Position: Lying Lying Lying Lying   Pulse: 75 69 96    Resp: 16 18 16 16   Temp: 98.1 °F (36.7 °C) 97.3 °F (36.3 °C) 97.5 °F (36.4 °C) 97.8 °F (36.6 °C)   TempSrc: Oral Oral Oral Oral   SpO2: 100% 98% 98%    Weight:       Height:           Physical Exam: 89 y.o. female    General Appearance:       Alert, cooperative, in no acute distress                  Extremities:    Dressing Clean, Dry and Intact             No clinical sign of DVT        Able to do good movements of digits    Pulses:   Pulses palpable and equal bilaterally           Diagnostic Tests:     Results from last 7 days   Lab Units 06/11/20  0513 06/10/20  0417 06/09/20  0500 06/08/20  1632   WBC 10*3/mm3  --  10.76 6.00 5.27   HEMOGLOBIN g/dL 11.1* 11.1* 13.0 12.3   HEMATOCRIT % 35.6 35.6 41.6 38.8   PLATELETS 10*3/mm3  --  233 254 242     Results from last 7 days   Lab Units 06/11/20  0513 06/10/20  0417 06/09/20  0632   SODIUM mmol/L 137 137 135*   POTASSIUM mmol/L 4.9 5.1 4.7   CHLORIDE mmol/L 99 103 102   CO2 mmol/L 26.0 23.0 20.0*   BUN mg/dL 16 22 20   CREATININE mg/dL 0.58 0.81 0.52*   GLUCOSE mg/dL 136* 222* 74   CALCIUM mg/dL 8.7 8.5 8.7     Results from last 7 days   Lab Units 06/08/20  1633   INR  1.09   APTT seconds 36.5     No results found for: URICACID  No results found for: CRYSTAL  Microbiology Results (last 10 days)     Procedure Component  Value - Date/Time    COVID PRE-OP / PRE-PROCEDURE SCREENING ORDER (NO ISOLATION) - Swab, Nasopharynx [[id number]] Collected:  06/08/20 1841    Lab Status:  Final result Specimen:  Swab from Nasopharynx Updated:  06/08/20 1941    Narrative:       The following orders were created for panel order COVID PRE-OP / PRE-PROCEDURE SCREENING ORDER (NO ISOLATION) - Swab, Nasopharynx.  Procedure                               Abnormality         Status                     ---------                               -----------         ------                     COVID-19,CEPHEID,CLIFFORD IN-...[[id number]]  Normal              Final result                 Please view results for these tests on the individual orders.    COVID-19,CEPHEID,CLIFFORD IN-HOUSE(OR EMERGENT/ADD-ON),NP SWAB IN TRANSPORT MEDIA 3-4 HR TAT - Swab, Nasopharynx [[id number]]  (Normal) Collected:  06/08/20 1841    Lab Status:  Final result Specimen:  Swab from Nasopharynx Updated:  06/08/20 1941     COVID19 Not Detected    Urine Culture - Urine, Urine, Catheter [[id number]]  (Normal) Collected:  06/08/20 1740    Lab Status:  Final result Specimen:  Urine, Catheter Updated:  06/10/20 0233     Urine Culture No growth        Xr Elbow 2 View Right    Result Date: 6/9/2020  1. No acute osseous abnormality. 2. Old posttraumatic and postoperative changes as noted above. Signer Name: Bi Costa MD  Signed: 6/9/2020 4:53 AM  Workstation Name: [facility]  [facility]    Xr Wrist 3+ View Right    Result Date: 6/9/2020  1.  No acute osseous abnormality. 2.  Previous distal ulnar resection. Degenerative wrist arthropathy. 3.  Chronic extensor tendon sheath calcification. Signer Name: Bi Costa MD  Signed: 6/9/2020 4:55 AM  Workstation Name: [facility]  [facility]    Xr Foot 3+ View Right    Result Date: 6/9/2020  No acute osseous abnormality. Signer Name: Bi Costa MD  Signed: 6/9/2020 4:56 AM  Workstation Name:  [id number]  [facility] [place]    Xr Chest 1 View    Result Date: 6/8/2020  Asymmetric elevation right hemidiaphragm with trace right pleural effusion however no pneumothorax.  D:  06/08/2020 E:  06/08/2020  This report was finalized on 6/8/2020 7:05 PM by Dr. Chase Knowles.      Fl C Arm During Surgery    Result Date: 6/10/2020  Intraoperative fluoroscopy utilized during right hemiarthroplasty.  D:  06/10/2020 E:  06/10/2020    This report was finalized on 6/10/2020 5:15 PM by Dr. Chase Knowles.      Xr Hip With Or Without Pelvis 2 - 3 View Right    Result Date: 6/10/2020  Status post right hip replacement with no hardware complication. Postsurgical changes are seen within the soft tissues.  D:  06/10/2020 E:  06/10/2020  This report was finalized on 6/10/2020 4:55 PM by Dr. Kari Bocanegra MD.      Xr Hip With Or Without Pelvis 2 - 3 View Right    Result Date: 6/8/2020  1. Abnormal foreshortening of the right proximal femur with cortical irregularity in the subcapital region of likely nondisplaced subcapital fracture versus femoral head involvement without displacement from the acetabular cup or protrusio. 2. Irregularity and cortical margins of the right inferior pubic ramus age-indeterminate minimally displaced fracture.  D:  06/08/2020 E:  06/08/2020  This report was finalized on 6/8/2020 7:05 PM by Dr. Chase Knowles.          Current Medications:  Scheduled Meds:  acetaminophen 1,000 mg Oral Q8H   aspirin 81 mg Oral Q12H   docusate sodium 100 mg Oral Daily   famotidine 20 mg Oral Daily   insulin lispro 0-7 Units Subcutaneous TID AC   lisinopril 10 mg Oral Q24H   metoprolol succinate XL 25 mg Oral Daily   sertraline 50 mg Oral Daily   sodium chloride 10 mL Intravenous Q12H     Continuous Infusions:  lactated ringers 100 mL/hr Last Rate: 100 mL/hr (06/11/20 0841)     PRN Meds:.acetaminophen  •  dextrose  •  dextrose  •  glucagon (human recombinant)  •  HYDROmorphone **AND** naloxone  •   melatonin  •  Morphine  •  ondansetron  •  oxyCODONE  •  oxyCODONE  •  polyethylene glycol  •  sodium chloride  •  traMADol    Assessment: Status post  No admission procedures for hospital encounter.    Patient Active Problem List   Diagnosis   • Allergic rhinitis   • Atrial fibrillation (CMS/HCC)   • Basal cell carcinoma of skin   • Diabetes mellitus (CMS/HCC)   • Degeneration of intervertebral disc of lumbar region   • Hyperlipidemia   • Hypertension   • Lichen sclerosus et atrophicus   • Macular degeneration   • Osteoarthritis   • Osteoporosis   • Vitamin D deficiency   • Closed left hip fracture (CMS/HCC)   • Closed subcapital fracture of right femur (CMS/HCC)       PLAN:   Continues current post-op course  Anticoagulation: Aspirin started  Mobilize with PT as tolerated per protocol    Weight Bearing: WBAT  Discharge Plan: OK to plan for discharge in  today to rehab  from orthopadic perspective.    Obey Vasquez MD    Date: 6/11/2020    Time: 09:39

## 2020-06-11 NOTE — DISCHARGE INSTRUCTIONS
[facility] K[facility]:  1. You have a sterile dressing in place. Only exchange the dressing if it becomes saturated (fluid draining out the sides of dressing) and notify the office. The dressing is water resistant. During the first 7 days after surgery, it is ok to shower. DO NOT scrub on or around the dressing. Do not submerge the dressing.  2. After 7 days, you can remove your dressing. Your sutures are all underneath your skin. There is a layer of glue over the incision. DO NOT pick at this or try to peal it off. If the edges do peel up it is ok to trim them as needed. It is OK to shower with the incision exposed. DO NOT scrub on or around the incision. DO NOT submerge the incision in pools, baths, or hot tubs for 1 month after surgery.  3. No creams or ointments to the incision for 1 month after surgery. After 1 month, it is recommended to massage the scar with vitamin E cream to help decrease scar formation.  4. Check incision every day and notify surgeon immediately if any of the following signs or symptoms are noted:  o Increase in redness  o Increase in swelling around the incision and of the entire extremity  o Increase in pain  o Drainage oozing from the incision  o Pulling apart of the edges of the incision  o Increase in overall body temperature (greater than 100.5 degrees)    Anticoagulants: You will be discharged on an anticoagulant. This is a prophylactic medication that helps prevent blood clots during your post-operative period. Most patients will be on Aspirin 81 mg Enteric coated every 12 hours orally for 30 days. Some patients, due to increased risk factors, will need to be on a stronger anticoagulant. Dr. Vasquez will discuss this need with you.   ? While taking the anticoagulant, you should avoid taking any additional aspirin, and limit ibuprofen (Advil or Motrin), Aleve (Naprosyn) or other non-steroidal anti-inflammatory medications.   ? Notify your surgeon immediately if  any lonnie bleeding is noted in the urine, stool, vomit, or from the nose or the incision. Blood in the stool will often appear as black rather than red. Blood in urine may appear as pink. Blood in vomit may appear as brown/black like coffee grounds.  ? You will need to apply pressure for longer periods of time to any cuts or abrasions to stop bleeding  ? Avoid alcohol while taking anticoagulants  Sequential Compression Device: You maybe be discharged home with a compression device that helps promote blood flow and prevent clots in your legs. Wear these at all times for the first two weeks.   Mobilization: The best way to avoid a blood clot is to get up and walk. 10 times a day get up and walk for 5 minutes for the first two weeks. Walking for longer periods of time will increase pain and swelling, making therapy more difficult. If taking any long travel (car or plane) in the first 1-2 months, be sure to get up and walk at least every one hour.     Stool Softeners: You will be at greater risk of constipation after surgery because of being less mobile and taking the pain medications.   ? Take stool softeners as instructed by your surgeon while on pain medications. Use over the counter Colace 100 mg 1-2 capsules twice daily.   ? If stools become too loose or too frequent, decreases the dosage or stop the stool softener.  ? If constipation occurs despite use of stool softeners, you are to continue the stool softeners and add a laxative (Milk of Magnesia 1 ounce daily as needed).  ? Dulcolax oral tabs or suppository or a fleets enema can also be utilized for constipation and can be obtained over the counter.   ? If above interventions are unsuccessful in inducing bowel movements, please contact your family physician's office/surgeon's office.  ? Drink plenty of fluids and eat fruits and vegetables during your recovery time    Pain Medications utilized after surgery are narcotics and the law requires that the following  information be given to all patients that are prescribed narcotics:  ? CLASSIFICATION: Pain medications are called Opioids and are narcotics  ? LEGALITIES: It is illegal to share narcotics with others and to drive within 24 hours of taking narcotics  ? POTENTIAL SIDE EFFECTS: Potential side effects of opioids include: nausea, vomiting, itching, dizziness, drowsiness, dry mouth, constipation, and difficulty urinating.  ? POTENTIAL ADVERSE EFFECTS:   o Opioid tolerance can develop with use of pain medications and this simply means that it requires more and more of the medication to control pain; however, this is seen more in patients that use Opioids for longer periods of time.  o Opioid dependence can develop with use of Opioids and this simply means that to stop the medication can cause withdrawal symptoms; however, this is seen with patients that use Opioids for longer periods of time.  o Opioid addiction can develop with use of Opioids and the incidence of this is very unlikely in patients who take the medications as ordered and stop the medications as instructed.  o Opioid overdose can be dangerous, but is unlikely when the medication is taken as ordered and stopped when ordered. It is important not to mix opioids with alcohol or with any type of sedative, such as Benadryl, as this can lead to over sedation and respiratory difficulty.  ? DOSAGE:   o Pain medications may be needed consistently for the first week to decrease pain and promote adequate pain relief and participation in physical therapy.  o After the initial surgical pain begins to resolve, you may begin to decrease the pain medication and only take it as needed. By the end of 6 weeks, you should be off of pain medications.  o You can decrease your pain medication consumption by slowly spacing out the time in between the medication, and using 650mg Tylenol when the pain is not as severe. Do not exceed 3500mg of Tylenol in 24 hours.   o Refills will not  be given by the office during evening hours, on weekends, or after 6 weeks post-op.  o To seek refills on pain medications during the initial 6 week post-operative period, you must call the office 48 hours in advance to request the refill. The office will then notify you when to  the prescription. DO NOT wait until you are out of the medication to request a refill.

## 2020-06-11 NOTE — PROGRESS NOTES
Continued Stay Note  [facility]     Patient Name: Yuli Jose  MRN: [id number]  Today's Date: 6/11/2020    Admit Date: 6/8/2020    Discharge Plan     Row Name 06/11/20 1211       Plan    Plan  [facility]    Plan Comments  Cm Delayed [facility] until 16:00 today. CM will continue to follow.    Final Discharge Disposition Code  03 - skilled nursing facility (SNF)        Discharge Codes    No documentation.       Expected Discharge Date and Time     Expected Discharge Date Expected Discharge Time    Jun 11, 2020             Bi Andersen RN

## (undated) DEVICE — INTENDED FOR TISSUE SEPARATION, AND OTHER PROCEDURES THAT REQUIRE A SHARP SURGICAL BLADE TO PUNCTURE OR CUT.: Brand: BARD-PARKER ® STAINLESS STEEL BLADES

## (undated) DEVICE — SPK10295 ORTHOPEDIC FRACTURE KIT: Brand: SPK10295 ORTHOPEDIC FRACTURE KIT

## (undated) DEVICE — MARKER,SKIN,W/RULER,DUAL,STOP: Brand: MEDLINE

## (undated) DEVICE — HANDPIECE SET WITH HIGH FLOW TIP AND SUCTION TUBE: Brand: INTERPULSE

## (undated) DEVICE — CVR HNDL LIGHT RIGID

## (undated) DEVICE — SUT MNCRYL PLS ANTIB UD 3/0 PS2 27IN

## (undated) DEVICE — PK MAJ SHLDR SPLT 10

## (undated) DEVICE — C-ARM DRAPE: Brand: DEROYAL

## (undated) DEVICE — 1010 S-DRAPE TOWEL DRAPE 10/BX: Brand: STERI-DRAPE™

## (undated) DEVICE — GLV SURG SENSICARE PI ORTHO SZ9 LF STRL

## (undated) DEVICE — CUP ACET RINGLOC BIPOL 28MM 45MM
Type: IMPLANTABLE DEVICE | Site: HIP | Status: NON-FUNCTIONAL
Removed: 2020-06-09

## (undated) DEVICE — PULLOVER TOGA, X-LARGE: Brand: FLYTE, SURGICOOL

## (undated) DEVICE — SCRB SURG BACTOSHIELD CHG 4PCT 4OZ

## (undated) DEVICE — UNDERGLV SURG BIOGEL INDICAT PI SZ8.5 BLU

## (undated) DEVICE — SHEET,DRAPE,53X77,STERILE: Brand: MEDLINE

## (undated) DEVICE — 6617 IOBAN II PATIENT ISOLATION DRAPE 5/BX,4BX/CS: Brand: STERI-DRAPE™ IOBAN™ 2

## (undated) DEVICE — PREMIUM DRY TRAY LF: Brand: MEDLINE INDUSTRIES, INC.

## (undated) DEVICE — PULLOVER TOGA, 2X LARGE: Brand: FLYTE, SURGICOOL

## (undated) DEVICE — DISPOSABLE ORTHOPAEDIC PROTECTOR: Brand: ALEXIS ® ORTHOPAEDIC PROTECTOR

## (undated) DEVICE — ANTIBACTERIAL UNDYED BRAIDED (POLYGLACTIN 910), SYNTHETIC ABSORBABLE SUTURE: Brand: COATED VICRYL

## (undated) DEVICE — DRSNG WND STRIP OPTIFOAM AG SUPRABS A/MIC 4X8IN STRL

## (undated) DEVICE — SOL ISO/ALC 70PCT 16OZ

## (undated) DEVICE — GLV SURG SENSICARE PI MIC PF SZ6 LF STRL

## (undated) DEVICE — STERILE PVP: Brand: MEDLINE INDUSTRIES, INC.